# Patient Record
Sex: MALE | Race: WHITE | Employment: PART TIME | URBAN - METROPOLITAN AREA
[De-identification: names, ages, dates, MRNs, and addresses within clinical notes are randomized per-mention and may not be internally consistent; named-entity substitution may affect disease eponyms.]

---

## 2018-01-29 ENCOUNTER — HOSPITAL ENCOUNTER (OUTPATIENT)
Dept: LAB | Age: 68
Discharge: HOME | End: 2018-01-29

## 2018-01-29 PROCEDURE — 88305 TISSUE EXAM BY PATHOLOGIST: CPT | Performed by: INTERNAL MEDICINE

## 2019-02-12 PROBLEM — N52.01 ERECTILE DYSFUNCTION DUE TO ARTERIAL INSUFFICIENCY: Status: ACTIVE | Noted: 2019-02-12

## 2021-06-15 ENCOUNTER — HOSPITAL ENCOUNTER (EMERGENCY)
Age: 71
Discharge: HOME | End: 2021-06-16
Attending: EMERGENCY MEDICINE
Payer: MEDICARE

## 2021-06-15 DIAGNOSIS — S62.102A LEFT WRIST FRACTURE, CLOSED, INITIAL ENCOUNTER: Primary | ICD-10-CM

## 2021-06-15 PROCEDURE — 75810000053 HC SPLINT APPLICATION

## 2021-06-15 PROCEDURE — 99284 EMERGENCY DEPT VISIT MOD MDM: CPT

## 2021-06-16 VITALS
SYSTOLIC BLOOD PRESSURE: 126 MMHG | WEIGHT: 173 LBS | RESPIRATION RATE: 18 BRPM | BODY MASS INDEX: 24.82 KG/M2 | TEMPERATURE: 98.6 F | HEART RATE: 82 BPM | DIASTOLIC BLOOD PRESSURE: 68 MMHG | OXYGEN SATURATION: 100 %

## 2021-06-16 PROBLEM — S52.572A OTHER INTRAARTICULAR FRACTURE OF LOWER END OF LEFT RADIUS, INITIAL ENCOUNTER FOR CLOSED FRACTURE: Status: ACTIVE | Noted: 2021-06-16

## 2021-06-16 PROCEDURE — 74011250637 HC RX REV CODE- 250/637: Performed by: PHYSICIAN ASSISTANT

## 2021-06-16 RX ORDER — HYDROCODONE BITARTRATE AND ACETAMINOPHEN 5; 325 MG/1; MG/1
1 TABLET ORAL ONCE
Status: COMPLETED | OUTPATIENT
Start: 2021-06-16 | End: 2021-06-16

## 2021-06-16 RX ORDER — HYDROCODONE BITARTRATE AND ACETAMINOPHEN 5; 325 MG/1; MG/1
1 TABLET ORAL
Qty: 12 TABLET | Refills: 0 | Status: SHIPPED | OUTPATIENT
Start: 2021-06-16 | End: 2021-06-19

## 2021-06-16 RX ADMIN — HYDROCODONE BITARTRATE AND ACETAMINOPHEN 1 TABLET: 5; 325 TABLET ORAL at 00:37

## 2021-06-16 NOTE — ED NOTES
I have reviewed discharge instructions with the patient. The patient verbalized understanding. Patient left ED via Discharge Method: ambulatory to Home with self. Opportunity for questions and clarification provided. Patient given 1 scripts. Medication explained to pt and pt v\u about medication, pt in no acute distress at discharge. To continue your aftercare when you leave the hospital, you may receive an automated call from our care team to check in on how you are doing. This is a free service and part of our promise to provide the best care and service to meet your aftercare needs.  If you have questions, or wish to unsubscribe from this service please call [phone]. Thank you for Choosing our [facility] Emergency Department.

## 2021-06-16 NOTE — H&P (VIEW-ONLY)
Orthopaedic Hand Clinic Note Name: Bennie Fortune YOB: 1950 Gender: male MRN: [id number] CC: Patient referred for evaluation of hand pain HPI: Bennie Fortune is a 78 y.o. male right handed with a chief complaint of  left wrist pain. The injury occurred 1 day ago when the patient  the patient was learning how to play pickle ball, within the first 10 minutes he sustained a fall into the left wrist, he was seen at the emergency department with a put him in a splint and referred to me for assessment. The pain is located diffusely about the wrist. Denies numbness or paresthesias of the fingers. Evaluation has included x-rays. Treatment to date has included splint. Denies loss of consciousness, head injury or neck pain. ROS/Meds/PSH/PMH/FH/SH: I personally reviewed the patients standard intake form. Pertinents are discussed in the HPI Physical Examination: 
General: Awake and alert. HEENT: Normocephalic, atraumatic CV/Pulm: Breathing even and unlabored Skin: No obvious rashes noted. Lymphatic: No obvious evidence of lymphedema or lymphadenopathy Musculoskeletal Exam: 
Examination of the Left upper extremity demonstrates no open wounds. Negative Tinel's over left carpal tunnel. Sensation is intact throughout, cap refill in all fingers < 5 seconds. Finger motion is limited with moderate swelling of the hand and fingers. Tenderness over the distal radius. Positive tenderness over the ulnar aspect of the wrist. No tenderness at elbow, shoulder, clavicle or cervical spine. Imaging / Electrodiagnostic Tests: XR of the left wrist demonstrates a distal radius fracture with 50 degrees volar displacement and significant shortening consistent with a volar shear fracture Assessment:  
1. Other intraarticular fracture of lower end of left radius, initial encounter for closed fracture Plan:  
We discussed the diagnosis and different treatment options.  We discussed observation, casting, further imaging, and surgical fixation and the risks, benefits and alternatives of all these options. After discussing in detail the patient elects to proceed with left distal radius open reduction internal fixation. Patient voiced accordance and understanding of the information provided and the formulated plan. All questions were answered to the patient's satisfaction during the encounter. 5 This is an illness/condition that threatens bodily function Treatment at this time: Elective major surgery with procedural risk factors Patient understands risks and benefits of LEFT DISTAL RADIUS OPEN REDUCTION INTERNAL FIXATION including but not limited to nerve injury, vessel injury, infection, failure to achieve desired results and possible need for additional surgery. Patient understands and wishes to proceed with surgery. On Exam:  
The patient is alert and oriented; ;  
Lung auscultation is clear bilaterally Heart has RRR without murmurs Heath Coburn MD 
Orthopaedic Surgery 06/16/21 
1:14 PM

## 2021-06-16 NOTE — ED PROVIDER NOTES
Patient is a 77-year-old male who presents from urgent care for the complaint of left wrist pain and injury. Earlier this evening patient was playing pickle ball went to run for the ball when he tripped and landed on his outstretched left arm. Did not hit his head and does not take any aspirin or blood thinners. Patient's wife took him to [facility] urgent care where he had x-rays and was instructed to go to the ER for further evaluation. Patient states that his pain is fine as long as he does not try to move the wrist.  He denies any numbness or tingling in the left hand or fingers. No pain in the elbow. He denies any previous injury to the left wrist.  Patient is right-hand dominant. The history is provided by the patient. Arm Pain   Pertinent negatives include no numbness and no back pain.         Past Medical History:   Diagnosis Date    Abdominal pain, other specified site     Acute sinusitis, unspecified     Calculus of ureter     Elevated prostate specific antigen (PSA)     Hypercholesterolemia     Hyperparathyroidism, unspecified ([address]     Kidney stones        Past Surgical History:   Procedure Laterality Date    HX LITHOTRIPSY      HX ORTHOPAEDIC      knee surgery    HX ORTHOPAEDIC      ankle    HX OTHER SURGICAL      ESWL         Family History:   Problem Relation Age of Onset    Hypertension Mother     Cancer Father         lung       Social History     Socioeconomic History    Marital status:      Spouse name: Not on file    Number of children: Not on file    Years of education: Not on file    Highest education level: Not on file   Occupational History    Not on file   Tobacco Use    Smoking status: Never Smoker    Smokeless tobacco: Never Used   Substance and Sexual Activity    Alcohol use: No    Drug use: Not on file    Sexual activity: Not on file   Other Topics Concern    Not on file   Social History Narrative    Not on file     Social Determinants of Health Financial Resource Strain:     Difficulty of Paying Living Expenses:    Food Insecurity:     Worried About Running Out of Food in the Last Year:     [address] in the Last Year:    Transportation Needs:     Lack of Transportation (Medical):  Lack of Transportation (Non-Medical):    Physical Activity:     Days of Exercise per Week:     Minutes of Exercise per Session:    Stress:     Feeling of Stress :    Social Connections:     Frequency of Communication with Friends and Family:     Frequency of Social Gatherings with Friends and Family:     Attends Yazidism Services:     Active Member of Clubs or Organizations:     Attends Club or Organization Meetings:     Marital Status:    Intimate Partner Violence:     Fear of Current or Ex-Partner:     Emotionally Abused:     Physically Abused:     Sexually Abused: ALLERGIES: Patient has no known allergies. Review of Systems   Constitutional: Negative for chills and fever. HENT: Negative for sore throat. Respiratory: Negative for cough and shortness of breath. Cardiovascular: Negative for chest pain. Gastrointestinal: Negative for abdominal pain, nausea and vomiting. Musculoskeletal: Positive for arthralgias and joint swelling. Negative for back pain. Left wrist pain and swelling   Neurological: Negative for dizziness, weakness and numbness. Psychiatric/Behavioral: Negative for agitation and confusion. Vitals:    06/15/21 2047   BP: 127/66   Pulse: 84   Resp: 18   Temp: 98.6 °F (37 °C)   SpO2: 100%   Weight: 78.5 kg (173 lb)            Physical Exam  Vitals and nursing note reviewed. Constitutional:       General: He is not in acute distress. Appearance: He is not ill-appearing or toxic-appearing. HENT:      Head: Normocephalic and atraumatic. Musculoskeletal:      Right wrist: Swelling, deformity, tenderness and bony tenderness present. Decreased range of motion. Normal pulse.       Right hand: No swelling, deformity or tenderness. Normal range of motion. Normal sensation. Normal capillary refill. Normal pulse. Skin:     General: Skin is warm and dry. Neurological:      Mental Status: He is alert and oriented to person, place, and time. Psychiatric:         Mood and Affect: Mood normal.         Behavior: Behavior normal.         Thought Content: Thought content normal.         Judgment: Judgment normal.          MDM  Number of Diagnoses or Management Options  Left wrist fracture, closed, initial encounter  Diagnosis management comments: Patient presenting from urgent care with fracture of the left wrist.  Left arm neurovascularly intact. Was able to upload x-rays which shows comminuted distal radial fracture and ulnar styloid fracture. Spoke with dillon Ny, over the phone and was able to send her pictures of x-rays, patient to follow-up with Dr. Khadar Parker in the office tomorrow morning for further evaluation and treatment of injury. Patient was placed in sugar tong splint and shoulder sling described in procedure note. Will DC with [place] for pain. Instructed to elevate above the level of the heart, ice no more than 10 minutes at a time on and off throughout the day and follow-up closely with orthopedics. To return to the ER with any worsening pain, numbness to the hand, color change in the hand or concerning symptoms. Patient and family verbalize understanding and are agreeable to plan.            Aileen Eugene    Date/Time: 6/16/2021 12:04 AM  Performed by: LONDON Prescott  Authorized by: LONDON Prescott     Consent:     Consent obtained:  Verbal    Consent given by:  Patient    Risks discussed:  Discoloration, numbness, pain and swelling    Alternatives discussed:  No treatment  Pre-procedure details:     Sensation:  Normal  Procedure details:     Laterality:  Left    Location:  Wrist    Wrist:  L wrist    Cast type:  Long arm    Splint type:  Sugar tong    Supplies: Ortho-Glass  Post-procedure details:     Pain:  Unchanged    Sensation:  Normal    Patient tolerance of procedure:   Tolerated well, no immediate complications

## 2021-06-16 NOTE — DISCHARGE INSTRUCTIONS
Elevate the left arm, apply ice no more than 10 times at a time on and off throughout the day. Call ortho office in the morning to set up appointment with Dr. Lorena Kennedy. Return to the ER with any worsening pain, numbness in the hand/fingers, color changes in the hand or concerning symptoms.

## 2021-06-17 ENCOUNTER — HOSPITAL ENCOUNTER (OUTPATIENT)
Age: 71
Setting detail: OUTPATIENT SURGERY
Discharge: HOME | End: 2021-06-17
Attending: ORTHOPAEDIC SURGERY | Admitting: ORTHOPAEDIC SURGERY
Payer: MEDICARE

## 2021-06-17 ENCOUNTER — ANESTHESIA (OUTPATIENT)
Dept: SURGERY | Age: 71
End: 2021-06-17
Payer: MEDICARE

## 2021-06-17 ENCOUNTER — APPOINTMENT (OUTPATIENT)
Dept: GENERAL RADIOLOGY | Age: 71
End: 2021-06-17
Attending: ORTHOPAEDIC SURGERY
Payer: MEDICARE

## 2021-06-17 ENCOUNTER — ANESTHESIA EVENT (OUTPATIENT)
Dept: SURGERY | Age: 71
End: 2021-06-17
Payer: MEDICARE

## 2021-06-17 VITALS
DIASTOLIC BLOOD PRESSURE: 76 MMHG | SYSTOLIC BLOOD PRESSURE: 160 MMHG | HEIGHT: 70 IN | OXYGEN SATURATION: 97 % | HEART RATE: 61 BPM | WEIGHT: 170 LBS | BODY MASS INDEX: 24.34 KG/M2 | TEMPERATURE: 98.2 F | RESPIRATION RATE: 27 BRPM

## 2021-06-17 PROCEDURE — 77030002916 HC SUT ETHLN J&J -A: Performed by: ORTHOPAEDIC SURGERY

## 2021-06-17 PROCEDURE — 25608 OPTX DST RD XART FX/EPI SEP2: CPT | Performed by: ORTHOPAEDIC SURGERY

## 2021-06-17 PROCEDURE — C1713 ANCHOR/SCREW BN/BN,TIS/BN: HCPCS | Performed by: ORTHOPAEDIC SURGERY

## 2021-06-17 PROCEDURE — 76060000032 HC ANESTHESIA 0.5 TO 1 HR: Performed by: ORTHOPAEDIC SURGERY

## 2021-06-17 PROCEDURE — 77030000031 HC BIT DRL QC SYNT -C: Performed by: ORTHOPAEDIC SURGERY

## 2021-06-17 PROCEDURE — 76210000020 HC REC RM PH II FIRST 0.5 HR: Performed by: ORTHOPAEDIC SURGERY

## 2021-06-17 PROCEDURE — 74011250636 HC RX REV CODE- 250/636: Performed by: ORTHOPAEDIC SURGERY

## 2021-06-17 PROCEDURE — 76210000063 HC OR PH I REC FIRST 0.5 HR: Performed by: ORTHOPAEDIC SURGERY

## 2021-06-17 PROCEDURE — 74011250636 HC RX REV CODE- 250/636: Performed by: ANESTHESIOLOGY

## 2021-06-17 PROCEDURE — 77030031139 HC SUT VCRL2 J&J -A: Performed by: ORTHOPAEDIC SURGERY

## 2021-06-17 PROCEDURE — 2709999900 HC NON-CHARGEABLE SUPPLY: Performed by: ORTHOPAEDIC SURGERY

## 2021-06-17 PROCEDURE — 74011250636 HC RX REV CODE- 250/636: Performed by: NURSE ANESTHETIST, CERTIFIED REGISTERED

## 2021-06-17 PROCEDURE — 77030040922 HC BLNKT HYPOTHRM STRY -A: Performed by: ANESTHESIOLOGY

## 2021-06-17 PROCEDURE — 76010010054 HC POST OP PAIN BLOCK: Performed by: ORTHOPAEDIC SURGERY

## 2021-06-17 PROCEDURE — 76942 ECHO GUIDE FOR BIOPSY: CPT | Performed by: ORTHOPAEDIC SURGERY

## 2021-06-17 PROCEDURE — 77030000032 HC CUF TRNQT ZIMM -B: Performed by: ORTHOPAEDIC SURGERY

## 2021-06-17 PROCEDURE — 76010000160 HC OR TIME 0.5 TO 1 HR INTENSV-TIER 1: Performed by: ORTHOPAEDIC SURGERY

## 2021-06-17 PROCEDURE — 74011000250 HC RX REV CODE- 250: Performed by: NURSE ANESTHETIST, CERTIFIED REGISTERED

## 2021-06-17 PROCEDURE — 77030003602 HC NDL NRV BLK BBMI -B: Performed by: ANESTHESIOLOGY

## 2021-06-17 DEVICE — 2.4MM VA-LCP 2-CLMN VLR DSTL RADIUS PL 6H HD/3H SHAFT/LEFT
Type: IMPLANTABLE DEVICE | Site: ARM | Status: FUNCTIONAL
Brand: VA-LCP

## 2021-06-17 DEVICE — SCREW BNE L13MM DIA24MM UNICORTICAL S STL ST NONCANNULATED: Type: IMPLANTABLE DEVICE | Site: ARM | Status: FUNCTIONAL

## 2021-06-17 DEVICE — 2.4MM VA LOCKING SCREW STARDRIVE 16MM: Type: IMPLANTABLE DEVICE | Site: ARM | Status: FUNCTIONAL

## 2021-06-17 DEVICE — 2.4MM VA LOCKING SCREW STARDRIVE 18MM: Type: IMPLANTABLE DEVICE | Site: ARM | Status: FUNCTIONAL

## 2021-06-17 DEVICE — 2.4MM VA LOCKING SCREW STARDRIVE 20MM: Type: IMPLANTABLE DEVICE | Site: ARM | Status: FUNCTIONAL

## 2021-06-17 DEVICE — SCREW BNE L14MM DIA2.4MM CORT S STL ST T8 STARDRV RECESS: Type: IMPLANTABLE DEVICE | Site: ARM | Status: FUNCTIONAL

## 2021-06-17 DEVICE — SCREW BNE L12MM DIA24MM CORT S STL ST T8 STARDRV RECESS: Type: IMPLANTABLE DEVICE | Site: ARM | Status: FUNCTIONAL

## 2021-06-17 RX ORDER — PROPOFOL 10 MG/ML
INJECTION, EMULSION INTRAVENOUS
Status: DISCONTINUED | OUTPATIENT
Start: 2021-06-17 | End: 2021-06-17 | Stop reason: HOSPADM

## 2021-06-17 RX ORDER — MIDAZOLAM HYDROCHLORIDE 1 MG/ML
2 INJECTION, SOLUTION INTRAMUSCULAR; INTRAVENOUS
Status: DISCONTINUED | OUTPATIENT
Start: 2021-06-17 | End: 2021-06-17 | Stop reason: HOSPADM

## 2021-06-17 RX ORDER — MIDAZOLAM HYDROCHLORIDE 1 MG/ML
4 INJECTION, SOLUTION INTRAMUSCULAR; INTRAVENOUS ONCE
Status: COMPLETED | OUTPATIENT
Start: 2021-06-17 | End: 2021-06-17

## 2021-06-17 RX ORDER — PROPOFOL 10 MG/ML
INJECTION, EMULSION INTRAVENOUS AS NEEDED
Status: DISCONTINUED | OUTPATIENT
Start: 2021-06-17 | End: 2021-06-17 | Stop reason: HOSPADM

## 2021-06-17 RX ORDER — SODIUM CHLORIDE, SODIUM LACTATE, POTASSIUM CHLORIDE, CALCIUM CHLORIDE 600; 310; 30; 20 MG/100ML; MG/100ML; MG/100ML; MG/100ML
75 INJECTION, SOLUTION INTRAVENOUS CONTINUOUS
Status: DISCONTINUED | OUTPATIENT
Start: 2021-06-17 | End: 2021-06-17 | Stop reason: HOSPADM

## 2021-06-17 RX ORDER — OXYCODONE HYDROCHLORIDE 5 MG/1
5 TABLET ORAL
Status: DISCONTINUED | OUTPATIENT
Start: 2021-06-17 | End: 2021-06-17 | Stop reason: HOSPADM

## 2021-06-17 RX ORDER — HYDROMORPHONE HYDROCHLORIDE 1 MG/ML
0.5 INJECTION, SOLUTION INTRAMUSCULAR; INTRAVENOUS; SUBCUTANEOUS
Status: DISCONTINUED | OUTPATIENT
Start: 2021-06-17 | End: 2021-06-17 | Stop reason: HOSPADM

## 2021-06-17 RX ORDER — CEFAZOLIN SODIUM/WATER 2 G/20 ML
2 SYRINGE (ML) INTRAVENOUS ONCE
Status: COMPLETED | OUTPATIENT
Start: 2021-06-17 | End: 2021-06-17

## 2021-06-17 RX ORDER — SODIUM CHLORIDE 0.9 % (FLUSH) 0.9 %
5-40 SYRINGE (ML) INJECTION AS NEEDED
Status: DISCONTINUED | OUTPATIENT
Start: 2021-06-17 | End: 2021-06-17 | Stop reason: HOSPADM

## 2021-06-17 RX ORDER — DEXAMETHASONE SODIUM PHOSPHATE 4 MG/ML
INJECTION, SOLUTION INTRA-ARTICULAR; INTRALESIONAL; INTRAMUSCULAR; INTRAVENOUS; SOFT TISSUE
Status: COMPLETED | OUTPATIENT
Start: 2021-06-17 | End: 2021-06-17

## 2021-06-17 RX ORDER — SODIUM CHLORIDE 0.9 % (FLUSH) 0.9 %
5-40 SYRINGE (ML) INJECTION EVERY 8 HOURS
Status: DISCONTINUED | OUTPATIENT
Start: 2021-06-17 | End: 2021-06-17 | Stop reason: HOSPADM

## 2021-06-17 RX ORDER — FENTANYL CITRATE 50 UG/ML
100 INJECTION, SOLUTION INTRAMUSCULAR; INTRAVENOUS ONCE
Status: COMPLETED | OUTPATIENT
Start: 2021-06-17 | End: 2021-06-17

## 2021-06-17 RX ORDER — LIDOCAINE HYDROCHLORIDE 20 MG/ML
INJECTION, SOLUTION EPIDURAL; INFILTRATION; INTRACAUDAL; PERINEURAL AS NEEDED
Status: DISCONTINUED | OUTPATIENT
Start: 2021-06-17 | End: 2021-06-17 | Stop reason: HOSPADM

## 2021-06-17 RX ORDER — HYDROCODONE BITARTRATE AND ACETAMINOPHEN 5; 325 MG/1; MG/1
2 TABLET ORAL AS NEEDED
Status: DISCONTINUED | OUTPATIENT
Start: 2021-06-17 | End: 2021-06-17 | Stop reason: HOSPADM

## 2021-06-17 RX ORDER — LIDOCAINE HYDROCHLORIDE 10 MG/ML
0.1 INJECTION INFILTRATION; PERINEURAL AS NEEDED
Status: DISCONTINUED | OUTPATIENT
Start: 2021-06-17 | End: 2021-06-17 | Stop reason: HOSPADM

## 2021-06-17 RX ADMIN — FENTANYL CITRATE 50 MCG: 50 INJECTION, SOLUTION INTRAMUSCULAR; INTRAVENOUS at 09:44

## 2021-06-17 RX ADMIN — LIDOCAINE HYDROCHLORIDE 20 MG: 20 INJECTION, SOLUTION EPIDURAL; INFILTRATION; INTRACAUDAL; PERINEURAL at 10:12

## 2021-06-17 RX ADMIN — PROPOFOL 100 MCG/KG/MIN: 10 INJECTION, EMULSION INTRAVENOUS at 10:15

## 2021-06-17 RX ADMIN — CEFAZOLIN 2 G: 1 INJECTION, POWDER, FOR SOLUTION INTRAVENOUS at 10:04

## 2021-06-17 RX ADMIN — PROPOFOL 30 MG: 10 INJECTION, EMULSION INTRAVENOUS at 10:12

## 2021-06-17 RX ADMIN — DEXAMETHASONE SODIUM PHOSPHATE 4 MG: 4 INJECTION, SOLUTION INTRAMUSCULAR; INTRAVENOUS at 09:47

## 2021-06-17 RX ADMIN — ROPIVACAINE HYDROCHLORIDE 30 ML: 5 INJECTION, SOLUTION EPIDURAL; INFILTRATION; PERINEURAL at 09:47

## 2021-06-17 RX ADMIN — SODIUM CHLORIDE, SODIUM LACTATE, POTASSIUM CHLORIDE, AND CALCIUM CHLORIDE 75 ML/HR: 600; 310; 30; 20 INJECTION, SOLUTION INTRAVENOUS at 09:35

## 2021-06-17 RX ADMIN — MIDAZOLAM 3 MG: 1 INJECTION INTRAMUSCULAR; INTRAVENOUS at 09:44

## 2021-06-17 NOTE — DISCHARGE INSTRUCTIONS
Postoperative  Instructions:      Weightbearing or Lifting: You  are  not  allowed  to  lift  any  weight  or  bear  any  weight  on  the  surgical  extremity  until  cleared  by  your  surgeon. Dressing  instructions:    Keep  your  dressing  and/or  splint  clean  and  dry  at  all  times. It  will  be  removed  at  your  first  post-operative  appointment or therapy apppointment. Your  stitches  will  be  removed  at  this  visit. Showering  Instructions:  May  shower  But keep surgical dressing clean and dry until removed as explained above. Pain  Control:  - You  have  been  given  a  prescription  to  be  taken  as  directed  for  post-operative  pain  control. In  addition,  elevate  the  operative  extremity  above  the  heart  at  all  times  to  prevent  swelling  and  throbbing  pain. - If you develop constipation while taking narcotic pain medications (Norco, Hydrocodone, Percocet, Oxycodone, Dilaudid, Hydromorphone) take  over-the-counter  Colace,  100mg  by  mouth  twice  a  Day. - Nausea  is  a  common  side  effect  of  many  pain  medications. You  will  want  to  eat something  before  taking  your  pain  medicine  to  help  prevent  Nausea. - If  you  are  taking  a  prescription  pain  medication  that  contains  acetaminophen,  we  recommend  that  you  do  not  take  additional  over  the  counter  acetaminophen  (Tylenol®). Other  pain  relieving  options:   - Using  a  cold  pack  to  ice  the  affected  area  a  few  times  a  day  (15  to  20  minutes  at  a  time)  can  help  to  relieve  pain,  reduce  swelling  and  bruising.      - Elevation  of  the  affected  area  can  also  help  to  reduce  pain  and  swelling. Did  you  receive  a  nerve  Block? A  nerve  block  can  provide  pain  relief  for  one  hour  to  two  days  after  your  surgery.   As  long  as  the  nerve  block  is  working,  you  will  experience  little  or  no  sensation in  the  area  the  surgeon  operated  on. As  the  nerve  block  wears  off,  you  will  begin  to  experience  pain  or  discomfort. It  is  very  important  that  you  begin  taking  your  prescribed  pain  medication  before  the  nerve  block  fully  wears  off. The first sign that the nerve block is wearing off is tingling in your fingers. Treating  your  pain  at  the  first  sign  of  the  block  wearing  off  will  ensure  your  pain  is  better  controlled  and  more  tolerable  when  full-sensation  returns. Do  not  wait  until  the  pain  is  intolerable,  as  the  medicine  will  be  less  effective. It  is  better  to  treat  pain  in  advance  than  to  try  and  catch  up. General  Anesthesia or Sedation:      If  you  did  not  receive  a  nerve  block  during  your  surgery,  you  will  need  to  start  taking  your  pain  medication  shortly  after  your  surgery  and  should  continue  to  do  so  as  prescribed  by  your  surgeon. Please  call  [phone]  with any concern and ask to speak with Gill Whitten. Concerning problems include:      -  Excessive  redness  of  the  incisions      -  Drainage  for  more  than  2  Days after surgery or any foul smelling drainage  -  Fever  of  more  than  101.5  F      Please  call  [phone]  if  you  do  not  receive  or  are  unsure  of  your  first  follow-up  appointment. You  should  see  the  doctor  10-14  days  after  your  Surgery. Thank you for choosing me and [address] [place] [address] for your care. I will go above and beyond to ensure you receive the best care possible. Teo Silva MD, PhD      Damaris Carmichael Call your doctor if pain is NOT relieved by medication.      Excessive bleeding that does not stop after holding pressure over the area  · Temperature of 101 degrees F or above  · Excessive redness, swelling or bruising, and/ or green or yellow, smelly discharge from incision      TYPICAL SIDE EFFECTS OF PAIN MEDICATION:     Constipation: Drink lots of fluids. Over the counter stool softener if needed.    Nausea: Take pain medication with food. Call your doctor with persistent nausea. ACTIVITY  · As tolerated and as directed by your doctor. · Bathe or shower as directed by your doctor. DIET  · Day of surgery: Clear liquids until no nausea or vomiting; small portion, light diet [place] foods (ex: baked chicken, plain rice, grits, scrambled eggs, toast). Nothing greasy, fried or spicy today. · Advance to regular diet on second day, unless your doctor orders otherwise. · If nausea and vomiting continues, call your doctor. PAIN  · Take pain medication as directed by your doctor. · DO NOT take aspirin or blood thinners unless directed by your doctor. AFTER ANESTHESIA   · For the first 24 hours: DO NOT Drive, Drink alcoholic beverages, or Make important decisions. · Be aware of dizziness following anesthesia and while taking pain medication. CONTRACEPTIVES  During your procedure you potentially received medication(s) which may reduce the effectiveness of contraceptives. Please consider other forms of contraception for 1 month following your procedure if you are currently using contraceptives as your primary form of birth control. In addition to this, it is recommended you continue your contraceptive as prescribed, unless otherwise instructed by your physician. PATIENT INSTRUCTIONS:    After general anesthesia or intravenous sedation, for 24 hours or while taking prescription Narcotics:  · Limit your activities  · Do not drive and operate hazardous machinery  · Do not make important personal or business decisions  · Do  not drink alcoholic beverages  · If you have not urinated within 8 hours after discharge, please contact your surgeon on call. *  Please give a list of your current medications to your Primary Care Provider.     * Please update this list whenever your medications are discontinued, doses are      changed, or new medications (including over-the-counter products) are added. *  Please carry medication information at all times in case of emergency situations. Preventing Infection at Home  We care about preventing infection and avoiding the spread of germs - not only when you are in the hospital but also when you return home. When you return home from the hospital, its important to take the following steps to help prevent infection and avoid spreading germs that could infect you and others. Ask everyone in your home to follow these guidelines, too. Clean Your Hands  · Clean your hands whenever your hands are visibly dirty, before you eat, before or after touching your mouth, nose or eyes, and before preparing food. Clean them after contact with body fluids, using the restroom, touching animals or changing diapers. · When washing hands, wet them with warm water and work up a lather. Rub hands for at least 15 seconds, then rinse them and pat them dry with a clean towel or paper towel. · When using hand sanitizers, it should take about 15 seconds to rub your hands dry. If not, you probably didnt apply enough . Cover Your Sneeze or Cough  Germs are released into the air whenever you sneeze or cough. To prevent the spread of infection:  · Turn away from other people before coughing or sneezing. · Cover your mouth or nose with a tissue when you cough or sneeze. Put the tissue in the trash. · If you dont have a tissue, cough or sneeze into your upper sleeve, not your hands. · Always clean your hands after coughing or sneezing. Care for Wounds  Your skin is your bodys first line of defense against germs, but an open wound leaves an easy way for germs to enter your body.  To prevent infection:  · Clean your hands before and after changing wound dressings, and wear gloves to change dressings if recommended by your doctor. · Take special care with IV lines or other devices inserted into the body. If you must touch them, clean your hands first.  · Follow any specific instructions from your doctor to care for your wounds. Contact your doctor if you experience any signs of infection, such as fever or increased redness at the surgical or wound site. Keep a Clean Home  · Clean or wipe commonly touched hard surfaces like door handles, sinks, tabletops, phones and TV remotes. · Use products labeled disinfectant to kill harmful bacteria and viruses. · Use a clean cloth or paper towel to clean and dry surfaces. Wiping surfaces with a dirty dishcloth, sponge or towel will only spread germs. · Never share toothbrushes, stokes, drinking glasses, utensils, razor blades, face cloths or bath towels to avoid spreading germs. · Be sure that the linens that you sleep on are clean. · Keep pets away from wounds and wash your hands after touching pets, their toys or bedding. We care about you and your health. Remember, preventing infections is a team effort between you, your family, friends and health care providers. These are general instructions for a healthy lifestyle:    No smoking/ No tobacco products/ Avoid exposure to second hand smoke  Surgeon General's Warning:  Quitting smoking now greatly reduces serious risk to your health. Obesity, smoking, and sedentary lifestyle greatly increases your risk for illness  A healthy diet, regular physical exercise & weight monitoring are important for maintaining a healthy lifestyle  You may be retaining fluid if you have a history of heart failure or if you experience any of the following symptoms:  Weight gain of 3 pounds or more overnight or 5 pounds in a week, increased swelling in our hands or feet or shortness of breath while lying flat in bed. Please call your doctor as soon as you notice any of these symptoms; do not wait until your next office visit.     Recognize signs and symptoms of STROKE:  F-face looks uneven  A-arms unable to move or move unevenly  S-speech slurred or non-existent  T-time-call 911 as soon as signs and symptoms begin-DO NOT go       Back to bed or wait to see if you get better-TIME IS BRAIN. Learning About Coronavirus [phone])  Coronavirus [phone]): Overview  What is coronavirus (COVID-19)? The coronavirus disease (COVID-19) is caused by a virus. It is an illness that was first found in [place], [place], in December 2019. It has since spread worldwide. The virus can cause fever, cough, and trouble breathing. In severe cases, it can cause pneumonia and make it hard to breathe without help. It can cause death. Coronaviruses are a large group of viruses. They cause the common cold. They also cause more serious illnesses like Middle East respiratory syndrome (MERS) and severe acute respiratory syndrome (SARS). COVID-19 is caused by a novel coronavirus. That means it's a new type that has not been seen in people before. This virus spreads person-to-person through droplets from coughing and sneezing. It can also spread when you are close to someone who is infected. And it can spread when you touch something that has the virus on it, such as a doorknob or a tabletop. What can you do to protect yourself from coronavirus (COVID-19)? The best way to protect yourself from getting sick is to:  · Avoid areas where there is an outbreak. · Avoid contact with people who may be infected. · Wash your hands often with soap or alcohol-based hand sanitizers. · Avoid crowds and try to stay at least 6 feet away from other people. · Wash your hands often, especially after you cough or sneeze. Use soap and water, and scrub for at least 20 seconds. If soap and water aren't available, use an alcohol-based hand . · Avoid touching your mouth, nose, and eyes. What can you do to avoid spreading the virus to others?   To help avoid spreading the virus to others:  · Cover your mouth with a tissue when you cough or sneeze. Then throw the tissue in the trash. · Use a disinfectant to clean things that you touch often. · Wear a cloth face cover if you have to go to public areas. · Stay home if you are sick or have been exposed to the virus. Don't go to school, work, or public areas. And don't use public transportation, ride-shares, or taxis unless you have no choice. · If you are sick:  ? Leave your home only if you need to get medical care. But call the doctor's office first so they know you're coming. And wear a face cover. ? Wear the face cover whenever you're around other people. It can help stop the spread of the virus when you cough or sneeze. ? Clean and disinfect your home every day. Use household  and disinfectant wipes or sprays. Take special care to clean things that you grab with your hands. These include doorknobs, remote controls, phones, and handles on your refrigerator and microwave. And don't forget countertops, tabletops, bathrooms, and computer keyboards. When to call for help  [id number] anytime you think you may need emergency care. For example, call if:  · You have severe trouble breathing. (You can't talk at all.)  · You have constant chest pain or pressure. · You are severely dizzy or lightheaded. · You are confused or can't think clearly. · Your face and lips have a blue color. · You pass out (lose consciousness) or are very hard to wake up. Call your doctor now if you develop symptoms such as:  · Shortness of breath. · Fever. · Cough. If you need to get care, call ahead to the doctor's office for instructions before you go. Make sure you wear a face cover to prevent exposing other people to the virus. Where can you get the latest information? The following health organizations are tracking and studying this virus. Their websites contain the most up-to-date information.  Soham Hernandez also learn what to do if you think you may have been exposed to the virus. · U.S. Centers for Disease Control and Prevention (CDC): The CDC provides updated news about the disease and travel advice. The website also tells you how to prevent the spread of infection. www.cdc.gov  · World Health Organization [facility]): WHO offers information about the virus outbreaks. WHO also has travel advice. www.who.int  Current as of: May 8, 2020               Content Version: 12.5  © 2006-2020 Health[employer]. Care instructions adapted under license by [employer] (which disclaims liability or warranty for this information). If you have questions about a medical condition or this instruction, always ask your healthcare professional. [address] any warranty or liability for your use of this information.

## 2021-06-17 NOTE — OP NOTES
OPERATIVE NOTE    Rei Frederick   [id number]  6/17/2021    PRE-OP DIAGNOSIS: Closed die punch fracture of distal radius, left, initial encounter [S52.293G]       POST-OP DIAGNOSIS: Closed die punch fracture of distal radius, left, initial encounter [S52.391K]    LATERALITY: Left    PROCEDURES PERFORMED:  Open treatment of Left intra-articular distal radius fracture with internal fixation of two fragments CPT 00629      SURGEON:  Marva Gill MD    IMPLANTS:  Implant Name Type Inv.  Item Serial No.  Lot No. LRB No. Used Action   PLATE RAD DSTL 2-C 6H HD/3H LT -- VOLAR VA-LCP 2.4MM - [id number]  PLATE RAD DSTL 2-C 6H HD/3H LT -- VOLAR VA-LCP 2.4MM  [employer] [place] [id number] Left 1 Implanted   SCREW BNE L13MM [id number] UNICORTICAL S STL ST NONCANNULATED - [id number]  SCREW BNE L13MM [id number] UNICORTICAL S STL ST NONCANNULATED  [facility]_ [id number] Left 1 Implanted   SCR VA LCK ST STRDRV 2.4X20MM --  - [id number]  SCR VA LCK ST STRDRV 2.4X20MM --   [employer] [place] [id number] Left 1 Implanted   SCR VA LCK ST STRDRV 2.4X16MM --  - [id number]  SCR VA LCK ST STRDRV 2.4X16MM --   [employer] [place] [id number] Left 1 Implanted   SCR VA LCK ST STRDRV 2.4X18MM --  - [id number]  SCR VA LCK ST STRDRV 2.4X18MM --   [employer] [place] [id number] Left 1 Implanted   SCR BNE CRTX ST T8 2.4X12MM SS --  - [id number]  SCR BNE CRTX ST T8 2.4X12MM SS --   [employer] [place] [id number] Left 1 Implanted   SCR BNE CRTX ST T8 2.4X14MM SS --  - [id number]  SCR BNE CRTX ST T8 2.4X14MM SS --   [employer] [place] [id number] Left 1 Implanted       Procedure(s):  LEFT DISTAL RADIUS OPEN REDUCTION INTERNAL FIXATION/ PLEXUS    Surgeon(s):  Katia Clark MD    Procedure(s):  LEFT DISTAL RADIUS OPEN REDUCTION INTERNAL FIXATION/ PLEXUS    ANESTHESIA: Regional    ESTIMATED BLOOD LOSS: Minimal    COMPLICATIONS: None    TOURNIQUET TIME:   Total Tourniquet Time Documented:  Upper Arm (Left) - 30 minutes  Total: Upper Arm (Left) - 30 minutes      INDICATION FOR PROCEDURE:  Leroy Barron sustained the above mentioned injuries as a result of a fall. Surgical and non-surgical treatment options were discussed with the patient and their family, as well as the risk and benefits of each option. Together, we decided to proceed with surgical management of their fracture. Specific to this treatment plan, we discussed in detail surgical risks including scar, pain, bleeding, infection, anesthetic risks, neurovascular injury, nonunion, malunion, hardware loosening or failure, need for further surgery,  weakness, stiffness, risk of death and potential risk of other unforseen complication. The patient did consent to the procedure after discussion of the risks and benefits. DESCRIPTION OF PROCEDURE:  The patient was identified in the holding room. The Left wrist was marked and confirmed as the correct operative site. They were then brought to the OR and general anesthesia was induced. They were transferred onto the OR table in the supine position. All bony prominences were well padded. SCDs were placed on the bilateral legs throughout the case. A timeout was performed, verifying the correct patient, the correct side and the correct procedure. Antibiotics were then administered, and were redosed during the procedure as needed at indicated intervals. A non-sterile tourniquet was placed on the arm    The upper extremity and shoulder girdle was pre scrubbed and then prepped and draped in routine sterile fashion. An incisional timeout was performed re-confirming the correct patient, surgical site and procedure, as well as verifying antibiotics. A volar FCR approach was utilized. Dissection was taken down through skin and subcutaneous tissue. Hemostasis was achieved with bipolar cautery. The FCR sheath was incised and it was retracted ulnarly. The FCR subsheath was incised.   The pronator quadratus was then sharply released from the radius in an L-shaped fashion and was retracted ulnarly to expose the distal radius. Dissection was taken down to bone. The fracture was identified and inspected. Fracture was reduced with manipulation and placement of a [place] elevator into the fracture to unhinge the volar cortex. Reduction was confirmed with fluoroscopy. A distal radius plate was chosen and placed along the volar surface. Once plate position was confirmed, the plate was secured to the bone with a nonlocking screw into the oblong hole. The distal row of screw holes were filled with locking screws of measured length after unicortical drilling. The plate was secured at the shaft with two additional screws, one more nonlocking and one locking screw. AP and lateral x-rays were obtained, showing adequate fracture reduction with restoration of height, palmar tilt and radial inclination. Articular wrist view was used after placement of each distal screw to demonstrated that all screws were outside of the joint. Final x-rays were obtained. These showed adequate reduction of the fracture, as well as adequate plate and screw placement and length. The tourniquet was deflated and hemostasis was ensured. The wounds were then thoroughly irrigated and closed in layered fashion with 4-0 vicryl and 4-0 stratafix. A sterile dressing was applied followed by a Volar splint    The patient was awakened and taken to PACU in stable condition. All sponge and needle counts were correct at the end of the case. I was present and scrubbed for the entire procedure.        DISPOSITION : Home    MECHANICAL VTE (DVT) PROPHYLAXIS: sequential compression devices    CHEMICAL VTE (DVT) PROPHYLAXIS: None warranted for this case    WEIGHT BEARING STATUS:   NWB on the Left upper extremity    FOLLOW-UP: in 10-14 days for suture removal.       Bartholomew Baumgarten, MD, PhD  Orthopaedic Surgery  06/17/21  10:52 AM

## 2021-06-17 NOTE — ANESTHESIA PREPROCEDURE EVALUATION
Relevant Problems   No relevant active problems       Anesthetic History   No history of anesthetic complications            Review of Systems / Medical History  Patient summary reviewed and pertinent labs reviewed    Pulmonary  Within defined limits                 Neuro/Psych   Within defined limits           Cardiovascular  Within defined limits                Exercise tolerance: >4 METS     GI/Hepatic/Renal     GERD: well controlled    Renal disease: stones       Endo/Other  Within defined limits           Other Findings              Physical Exam    Airway  Mallampati: II  TM Distance: 4 - 6 cm  Neck ROM: normal range of motion   Mouth opening: Normal     Cardiovascular  Regular rate and rhythm,  S1 and S2 normal,  no murmur, click, rub, or gallop             Dental         Pulmonary  Breath sounds clear to auscultation               Abdominal         Other Findings            Anesthetic Plan    ASA: 2  Anesthesia type: total IV anesthesia      Post-op pain plan if not by surgeon: peripheral nerve block single    Induction: Intravenous  Anesthetic plan and risks discussed with: Patient

## 2021-06-17 NOTE — ANESTHESIA PROCEDURE NOTES
Peripheral Block    Start time: 6/17/2021 9:45 AM  End time: 6/17/2021 9:48 AM  Performed by: Alveria Closs., MD  Authorized by: Alveria Closs., MD       Pre-procedure: Indications: at surgeon's request, post-op pain management and procedure for pain    Preanesthetic Checklist: patient identified, risks and benefits discussed, site marked, timeout performed, anesthesia consent given and patient being monitored    Timeout Time: 09:44 EDT          Block Type:   Block Type:  Supraclavicular  Laterality:  Left  Monitoring:  Standard ASA monitoring, responsive to questions, oxygen, continuous pulse ox, heart rate and frequent vital sign checks  Injection Technique:  Single shot  Procedures: ultrasound guided and nerve stimulator    Patient Position: supine  Prep: chlorhexidine    Location:  Supraclavicular  Needle Type:  Stimuplex  Needle Gauge:  22 G  Needle Localization:  Nerve stimulator and ultrasound guidance  Motor Response comment:   Motor Response: minimal motor response >0.4 mA   Medication Injected:  Ropivacaine 0.5% with epinephrine 1:200,000 injection, 30 mL (Mixture components: EPINEPHrine HCl (PF) 1 mg/mL (1 mL) Soln, . 005 mL; ropivacaine (PF) 5 mg/mL (0.5 %) Soln, 1 mL)  dexamethasone (DECADRON) 4 mg/mL injection, 4 mg  Med Admin Time: 6/17/2021 9:47 AM    Assessment:  Number of attempts:  1  Injection Assessment:  Incremental injection every 5 mL, no paresthesia, ultrasound image on chart, local visualized surrounding nerve on ultrasound, negative aspiration for blood and no intravascular symptoms  Patient tolerance:  Patient tolerated the procedure well with no immediate complications

## 2021-06-17 NOTE — INTERVAL H&P NOTE
H&P Update: 
Albarupesh Silveira was seen and examined. History and physical has been reviewed. The patient has been examined. There have been no significant clinical changes since the completion of the originally dated History and Physical. 
 
Rene Joseph MD 
Orthopaedic Surgery 06/17/21 
9:25 AM

## 2021-11-17 ENCOUNTER — HOSPITAL ENCOUNTER (OUTPATIENT)
Dept: LAB | Age: 71
Discharge: HOME | End: 2021-11-17

## 2021-11-17 PROCEDURE — 88305 TISSUE EXAM BY PATHOLOGIST: CPT

## 2022-02-15 ENCOUNTER — APPOINTMENT (RX ONLY)
Dept: URBAN - METROPOLITAN AREA CLINIC 330 | Facility: CLINIC | Age: 72
Setting detail: DERMATOLOGY
End: 2022-02-15

## 2022-02-15 DIAGNOSIS — L30.8 OTHER SPECIFIED DERMATITIS: ICD-10-CM

## 2022-02-15 PROCEDURE — ? ADDITIONAL NOTES

## 2022-02-15 PROCEDURE — ? COUNSELING

## 2022-02-15 PROCEDURE — ? PRESCRIPTION MEDICATION MANAGEMENT

## 2022-02-15 PROCEDURE — 99213 OFFICE O/P EST LOW 20 MIN: CPT

## 2022-02-15 PROCEDURE — ? TREATMENT REGIMEN

## 2022-02-15 ASSESSMENT — LOCATION ZONE DERM: LOCATION ZONE: TRUNK

## 2022-02-15 ASSESSMENT — LOCATION DETAILED DESCRIPTION DERM: LOCATION DETAILED: PERIUMBILICAL SKIN

## 2022-02-15 ASSESSMENT — LOCATION SIMPLE DESCRIPTION DERM: LOCATION SIMPLE: ABDOMEN

## 2022-02-15 NOTE — PROCEDURE: TREATMENT REGIMEN
Detail Level: Zone
Initiate Treatment: Use moisturizer after shower while wet/damp, let soak in then apply 2nd layer\\nDML forte

## 2022-02-15 NOTE — PROCEDURE: MIPS QUALITY
Quality 226: Preventive Care And Screening: Tobacco Use: Screening And Cessation Intervention: Patient screened for tobacco use and is an ex/non-smoker
Quality 111:Pneumonia Vaccination Status For Older Adults: Pneumococcal Vaccination not Administered or Previously Received, Reason not Otherwise Specified
Quality 431: Preventive Care And Screening: Unhealthy Alcohol Use - Screening: Patient not identified as an unhealthy alcohol user when screened for unhealthy alcohol use using a systematic screening method
Detail Level: Detailed
Quality 110: Preventive Care And Screening: Influenza Immunization: Influenza Immunization Administered during Influenza season

## 2022-02-17 ENCOUNTER — RX ONLY (OUTPATIENT)
Age: 72
Setting detail: RX ONLY
End: 2022-02-17

## 2022-02-17 RX ORDER — TRIAMCINOLONE ACETONIDE 1 MG/G
CREAM TOPICAL
Qty: 454 | Refills: 2 | Status: CANCELLED

## 2022-02-17 RX ORDER — TRIAMCINOLONE ACETONIDE 1 MG/G
CREAM TOPICAL
Qty: 454 | Refills: 2 | Status: ERX | COMMUNITY
Start: 2022-02-17

## 2022-03-19 PROBLEM — N52.01 ERECTILE DYSFUNCTION DUE TO ARTERIAL INSUFFICIENCY: Status: ACTIVE | Noted: 2019-02-12

## 2022-03-19 PROBLEM — S52.572A OTHER INTRAARTICULAR FRACTURE OF LOWER END OF LEFT RADIUS, INITIAL ENCOUNTER FOR CLOSED FRACTURE: Status: ACTIVE | Noted: 2021-06-16

## 2022-06-30 ENCOUNTER — APPOINTMENT (RX ONLY)
Dept: URBAN - METROPOLITAN AREA CLINIC 329 | Facility: CLINIC | Age: 72
Setting detail: DERMATOLOGY
End: 2022-06-30

## 2022-06-30 DIAGNOSIS — L57.0 ACTINIC KERATOSIS: ICD-10-CM

## 2022-06-30 DIAGNOSIS — L81.4 OTHER MELANIN HYPERPIGMENTATION: ICD-10-CM

## 2022-06-30 DIAGNOSIS — D22 MELANOCYTIC NEVI: ICD-10-CM

## 2022-06-30 DIAGNOSIS — L72.8 OTHER FOLLICULAR CYSTS OF THE SKIN AND SUBCUTANEOUS TISSUE: ICD-10-CM

## 2022-06-30 DIAGNOSIS — L11.1 TRANSIENT ACANTHOLYTIC DERMATOSIS [GROVER]: ICD-10-CM

## 2022-06-30 PROBLEM — D22.5 MELANOCYTIC NEVI OF TRUNK: Status: ACTIVE | Noted: 2022-06-30

## 2022-06-30 PROCEDURE — ? LIQUID NITROGEN

## 2022-06-30 PROCEDURE — 17003 DESTRUCT PREMALG LES 2-14: CPT

## 2022-06-30 PROCEDURE — ? SHAVE REMOVAL

## 2022-06-30 PROCEDURE — ? ADDITIONAL NOTES

## 2022-06-30 PROCEDURE — 11311 SHAVE SKIN LESION 0.6-1.0 CM: CPT

## 2022-06-30 PROCEDURE — ? FULL BODY SKIN EXAM - DECLINED

## 2022-06-30 PROCEDURE — 99213 OFFICE O/P EST LOW 20 MIN: CPT | Mod: 25

## 2022-06-30 PROCEDURE — ? PRESCRIPTION MEDICATION MANAGEMENT

## 2022-06-30 PROCEDURE — ? SUNSCREEN RECOMMENDATIONS

## 2022-06-30 PROCEDURE — 17000 DESTRUCT PREMALG LESION: CPT | Mod: 59

## 2022-06-30 PROCEDURE — ? COUNSELING

## 2022-06-30 ASSESSMENT — LOCATION SIMPLE DESCRIPTION DERM
LOCATION SIMPLE: ABDOMEN
LOCATION SIMPLE: FRONTAL SCALP
LOCATION SIMPLE: UPPER BACK
LOCATION SIMPLE: RIGHT FOREHEAD
LOCATION SIMPLE: LEFT FOREHEAD
LOCATION SIMPLE: CHEST
LOCATION SIMPLE: RIGHT SCALP
LOCATION SIMPLE: RIGHT TEMPLE
LOCATION SIMPLE: LOWER BACK
LOCATION SIMPLE: RIGHT UPPER ARM
LOCATION SIMPLE: LEFT CHEEK

## 2022-06-30 ASSESSMENT — LOCATION DETAILED DESCRIPTION DERM
LOCATION DETAILED: STERNUM
LOCATION DETAILED: RIGHT FOREHEAD
LOCATION DETAILED: RIGHT CENTRAL FRONTAL SCALP
LOCATION DETAILED: RIGHT ANTERIOR DISTAL UPPER ARM
LOCATION DETAILED: INFERIOR THORACIC SPINE
LOCATION DETAILED: RIGHT RIB CAGE
LOCATION DETAILED: RIGHT CENTRAL TEMPLE
LOCATION DETAILED: LEFT SUPERIOR MEDIAL MALAR CHEEK
LOCATION DETAILED: EPIGASTRIC SKIN
LOCATION DETAILED: MEDIAL FRONTAL SCALP
LOCATION DETAILED: LEFT RIB CAGE
LOCATION DETAILED: RIGHT SUPERIOR FOREHEAD
LOCATION DETAILED: LEFT SUPERIOR FOREHEAD
LOCATION DETAILED: SUPERIOR LUMBAR SPINE

## 2022-06-30 ASSESSMENT — LOCATION ZONE DERM
LOCATION ZONE: SCALP
LOCATION ZONE: TRUNK
LOCATION ZONE: FACE
LOCATION ZONE: ARM

## 2022-06-30 NOTE — PROCEDURE: PRESCRIPTION MEDICATION MANAGEMENT
Continue Regimen: Triamcinolone cream (patient has)
Detail Level: Zone
Render In Strict Bullet Format?: No

## 2022-06-30 NOTE — PROCEDURE: MIPS QUALITY
Detail Level: Detailed
Quality 111:Pneumonia Vaccination Status For Older Adults: Pneumococcal vaccine was not administered on or after patient’s 60th birthday and before the end of the measurement period, reason not otherwise specified
Quality 130: Documentation Of Current Medications In The Medical Record: Current Medications Documented
Quality 47: Advance Care Plan: Advance Care Planning discussed and documented in the medical record; patient did not wish or was not able to name a surrogate decision maker or provide an advance care plan.

## 2022-07-11 DIAGNOSIS — R97.20 ELEVATED PSA: Primary | ICD-10-CM

## 2022-07-13 ENCOUNTER — NURSE ONLY (OUTPATIENT)
Dept: UROLOGY | Age: 72
End: 2022-07-13

## 2022-07-13 ENCOUNTER — TELEPHONE (OUTPATIENT)
Dept: UROLOGY | Age: 72
End: 2022-07-13

## 2022-07-13 DIAGNOSIS — R97.20 ELEVATED PSA: ICD-10-CM

## 2022-07-13 LAB — PSA SERPL-MCNC: 6.3 NG/ML

## 2022-07-13 NOTE — TELEPHONE ENCOUNTER
Hello pt came to inform that he needs a refill on his medication (sidlenafil 20mg). He no longer uses the [facility]. Pt wants to get it sent to [address] ([address] [phone]). He did state that the [facility] only carries the 25mg. Please contact pt if we can send in the prescription thank you!

## 2022-07-14 RX ORDER — SILDENAFIL CITRATE 20 MG/1
20 TABLET ORAL 3 TIMES DAILY
Qty: 30 TABLET | Refills: 1 | Status: SHIPPED | OUTPATIENT
Start: 2022-07-14

## 2022-08-03 ENCOUNTER — OFFICE VISIT (OUTPATIENT)
Dept: UROLOGY | Age: 72
End: 2022-08-03
Payer: MEDICARE

## 2022-08-03 DIAGNOSIS — N52.01 ERECTILE DYSFUNCTION DUE TO ARTERIAL INSUFFICIENCY: ICD-10-CM

## 2022-08-03 DIAGNOSIS — R97.20 ELEVATED PSA: Primary | ICD-10-CM

## 2022-08-03 LAB
BILIRUBIN, URINE, POC: NEGATIVE
BLOOD URINE, POC: NEGATIVE
GLUCOSE URINE, POC: NEGATIVE
KETONES, URINE, POC: NEGATIVE
LEUKOCYTE ESTERASE, URINE, POC: NEGATIVE
NITRITE, URINE, POC: NEGATIVE
PH, URINE, POC: 6 (ref 4.6–8)
PROTEIN,URINE, POC: NEGATIVE
SPECIFIC GRAVITY, URINE, POC: 1.03 (ref 1–1.03)
URINALYSIS CLARITY, POC: NORMAL
URINALYSIS COLOR, POC: NORMAL
UROBILINOGEN, POC: NORMAL

## 2022-08-03 PROCEDURE — 3017F COLORECTAL CA SCREEN DOC REV: CPT | Performed by: UROLOGY

## 2022-08-03 PROCEDURE — G8421 BMI NOT CALCULATED: HCPCS | Performed by: UROLOGY

## 2022-08-03 PROCEDURE — 81003 URINALYSIS AUTO W/O SCOPE: CPT | Performed by: UROLOGY

## 2022-08-03 PROCEDURE — 1036F TOBACCO NON-USER: CPT | Performed by: UROLOGY

## 2022-08-03 PROCEDURE — 99213 OFFICE O/P EST LOW 20 MIN: CPT | Performed by: UROLOGY

## 2022-08-03 PROCEDURE — 1123F ACP DISCUSS/DSCN MKR DOCD: CPT | Performed by: UROLOGY

## 2022-08-03 PROCEDURE — G8427 DOCREV CUR MEDS BY ELIG CLIN: HCPCS | Performed by: UROLOGY

## 2022-08-03 RX ORDER — HYDROCHLOROTHIAZIDE 50 MG/1
50 TABLET ORAL DAILY
COMMUNITY
Start: 2022-01-24 | End: 2023-01-19

## 2022-08-03 ASSESSMENT — ENCOUNTER SYMPTOMS
NAUSEA: 0
BACK PAIN: 0

## 2022-08-03 NOTE — PROGRESS NOTES
[facility]  [address] [facility]    [address] [facility] [place]  : 1950    Chief Complaint   Patient presents with    Follow-up     HPI     Krish Sprague is a 73 y.o.  male with a PMH of elevated PSA s/p negative TRUS biopsy 2012 and ED on sildenafil who returns for follow up. Previously followed with Dr. Víctor Werner. Last seen 2021  PSA baseline ~5. PSA 6.3 this year from 5.3 last year. He is voiding well without bothersome urgency, frequency, urinary retention, hematuria, urinary incontinence. He is not on any prostate meds. Sildenafil is working well for his ED. Lab Results   Component Value Date    PSA 6.3 (H) 2022    PSA 5.3 (H) 2021    PSA 5.1 (H) 2020         Past Medical History:   Diagnosis Date    Abdominal pain, other specified site     Acute sinusitis, unspecified     Calculus of ureter     Elevated prostate specific antigen (PSA)     GERD (gastroesophageal reflux disease)     Hypercholesterolemia     Hyperparathyroidism, unspecified ([address]     Kidney stones      Past Surgical History:   Procedure Laterality Date    COLONOSCOPY      LITHOTRIPSY      ORTHOPEDIC SURGERY Right     knee surgery    ORTHOPEDIC SURGERY Right     ankle    OTHER SURGICAL HISTORY      ESWL     Current Outpatient Medications   Medication Sig Dispense Refill    hydroCHLOROthiazide (HYDRODIURIL) 50 MG tablet Take 50 mg by mouth in the morning.       sildenafil (REVATIO) 20 MG tablet Take 1 tablet by mouth 3 times daily May take up to 5 at a time as needed, dispense #30 as needed refill 30 tablet 1    atorvastatin (LIPITOR) 20 MG tablet Take by mouth      ibuprofen (ADVIL;MOTRIN) 200 MG tablet Take by mouth      omeprazole (PRILOSEC) 20 MG delayed release capsule Take 20 mg by mouth daily      ondansetron (ZOFRAN-ODT) 4 MG disintegrating tablet Take 4 mg by mouth every 8 hours as needed       No current facility-administered medications for this visit. Not on File  Social History     Socioeconomic History    Marital status:      Spouse name: Not on file    Number of children: Not on file    Years of education: Not on file    Highest education level: Not on file   Occupational History    Not on file   Tobacco Use    Smoking status: Never    Smokeless tobacco: Never   Substance and Sexual Activity    Alcohol use: Yes    Drug use: Never    Sexual activity: Not on file   Other Topics Concern    Not on file   Social History Narrative    Not on file     Social Determinants of Health     Financial Resource Strain: Not on file   Food Insecurity: Not on file   Transportation Needs: Not on file   Physical Activity: Not on file   Stress: Not on file   Social Connections: Not on file   Intimate Partner Violence: Not on file   Housing Stability: Not on file     Family History   Problem Relation Age of Onset    Cancer Father         lung    Hypertension Mother        Review of Systems  Constitutional:   Negative for fever. GI:  Negative for nausea. Musculoskeletal:  Negative for back pain. Urinalysis  UA - Dipstick  Results for orders placed or performed in visit on 08/03/22   AMB POC URINALYSIS DIP STICK AUTO W/O MICRO   Result Value Ref Range    Color (UA POC)      Clarity (UA POC)      Glucose, Urine, POC Negative Negative    Bilirubin, Urine, POC Negative Negative    KETONES, Urine, POC Negative Negative    Specific Gravity, Urine, POC 1.030 1.001 - 1.035    Blood (UA POC) Negative Negative    pH, Urine, POC 6.0 4.6 - 8.0    Protein, Urine, POC Negative Negative    Urobilinogen, POC 0.2 mg/dL     Nitrite, Urine, POC Negative Negative    Leukocyte Esterase, Urine, POC Negative Negative       There were no vitals taken for this visit.      GENERAL: No acute distress, Awake, Alert, Oriented X 3, Gait normal  CARDIAC: regular rate and rhythm  CHEST AND LUNG: Easy work of breathing, clear to auscultation bilaterally, no cyanosis  ABDOMEN: soft, non tender, non-distended, positive bowel sounds, no organomegaly, no palpable masses, no guarding, no rebound tenderness  ALLI: 35 gram, symmetric, smooth without nodules. SKIN: No rash, no erythema, no lacerations or abrasions, no ecchymosis  NEUROLOGIC: cranial nerves 2-12 grossly intact     Assessment and Plan    ICD-10-CM    1. Elevated PSA  R97.20 AMB POC URINALYSIS DIP STICK AUTO W/O MICRO     PSA, Diagnostic      2. Erectile dysfunction due to arterial insufficiency  N52.01         Elevated PSA  PSA up to 6.3 this year from 5.3. Usually in 5 range  Doing well. Will continue annual screening. Follow up 1 year with PSA     ED:   Continue sildenafil. Doing well. Does not need refills today. I have spent 20 minutes today reviewing previous notes, test results and face to face with the patient as well as documenting. Yaya Ramirez M.D.     [facility]  [place] [address] [address] [facility]  Phone: [phone]  Fax: [phone]

## 2022-10-06 ENCOUNTER — APPOINTMENT (RX ONLY)
Dept: URBAN - METROPOLITAN AREA CLINIC 329 | Facility: CLINIC | Age: 72
Setting detail: DERMATOLOGY
End: 2022-10-06

## 2022-10-06 DIAGNOSIS — L57.8 OTHER SKIN CHANGES DUE TO CHRONIC EXPOSURE TO NONIONIZING RADIATION: ICD-10-CM

## 2022-10-06 DIAGNOSIS — L82.1 OTHER SEBORRHEIC KERATOSIS: ICD-10-CM

## 2022-10-06 DIAGNOSIS — L21.8 OTHER SEBORRHEIC DERMATITIS: ICD-10-CM

## 2022-10-06 DIAGNOSIS — L85.3 XEROSIS CUTIS: ICD-10-CM

## 2022-10-06 PROCEDURE — ? COUNSELING

## 2022-10-06 PROCEDURE — ? SUNSCREEN RECOMMENDATIONS

## 2022-10-06 PROCEDURE — 99214 OFFICE O/P EST MOD 30 MIN: CPT

## 2022-10-06 PROCEDURE — ? FULL BODY SKIN EXAM - DECLINED

## 2022-10-06 PROCEDURE — ? PRESCRIPTION

## 2022-10-06 RX ORDER — MOMETASONE FUROATE 1 MG/G
OINTMENT TOPICAL
Qty: 45 | Refills: 3 | Status: ERX | COMMUNITY
Start: 2022-10-06

## 2022-10-06 RX ADMIN — MOMETASONE FUROATE: 1 OINTMENT TOPICAL at 00:00

## 2022-10-06 ASSESSMENT — LOCATION SIMPLE DESCRIPTION DERM
LOCATION SIMPLE: ABDOMEN
LOCATION SIMPLE: FRONTAL SCALP
LOCATION SIMPLE: LEFT POSTERIOR THIGH
LOCATION SIMPLE: ABDOMEN
LOCATION SIMPLE: RIGHT EYEBROW
LOCATION SIMPLE: UPPER BACK
LOCATION SIMPLE: LEFT UPPER ARM
LOCATION SIMPLE: LEFT UPPER BACK
LOCATION SIMPLE: LEFT THIGH
LOCATION SIMPLE: LEFT POSTERIOR THIGH
LOCATION SIMPLE: RIGHT UPPER BACK
LOCATION SIMPLE: LEFT UPPER BACK
LOCATION SIMPLE: LEFT CHEEK
LOCATION SIMPLE: LEFT UPPER ARM
LOCATION SIMPLE: RIGHT CHEEK
LOCATION SIMPLE: CHEST
LOCATION SIMPLE: LEFT CHEEK
LOCATION SIMPLE: LEFT THIGH
LOCATION SIMPLE: RIGHT FOREHEAD
LOCATION SIMPLE: LEFT FOREHEAD

## 2022-10-06 ASSESSMENT — LOCATION DETAILED DESCRIPTION DERM
LOCATION DETAILED: PERIUMBILICAL SKIN
LOCATION DETAILED: LEFT ANTERIOR PROXIMAL THIGH
LOCATION DETAILED: LEFT DISTAL POSTERIOR THIGH
LOCATION DETAILED: LEFT MEDIAL MALAR CHEEK
LOCATION DETAILED: RIGHT INFERIOR MEDIAL FOREHEAD
LOCATION DETAILED: RIGHT INFERIOR MEDIAL UPPER BACK
LOCATION DETAILED: RIGHT FOREHEAD
LOCATION DETAILED: LEFT DISTAL POSTERIOR UPPER ARM
LOCATION DETAILED: RIGHT MID-UPPER BACK
LOCATION DETAILED: LEFT RIB CAGE
LOCATION DETAILED: LEFT MEDIAL SUPERIOR CHEST
LOCATION DETAILED: LEFT ANTERIOR DISTAL THIGH
LOCATION DETAILED: LEFT SUPERIOR FOREHEAD
LOCATION DETAILED: LEFT DISTAL POSTERIOR THIGH
LOCATION DETAILED: EPIGASTRIC SKIN
LOCATION DETAILED: LEFT LATERAL UPPER BACK
LOCATION DETAILED: RIGHT INFERIOR CENTRAL MALAR CHEEK
LOCATION DETAILED: INFERIOR THORACIC SPINE
LOCATION DETAILED: LEFT MEDIAL MALAR CHEEK
LOCATION DETAILED: RIGHT CENTRAL EYEBROW
LOCATION DETAILED: RIGHT MEDIAL FOREHEAD
LOCATION DETAILED: LEFT DISTAL POSTERIOR UPPER ARM
LOCATION DETAILED: MEDIAL FRONTAL SCALP
LOCATION DETAILED: LEFT INFERIOR MEDIAL UPPER BACK
LOCATION DETAILED: PERIUMBILICAL SKIN
LOCATION DETAILED: LEFT ANTERIOR DISTAL UPPER ARM
LOCATION DETAILED: LEFT MID-UPPER BACK

## 2022-10-06 ASSESSMENT — LOCATION ZONE DERM
LOCATION ZONE: SCALP
LOCATION ZONE: ARM
LOCATION ZONE: TRUNK
LOCATION ZONE: FACE
LOCATION ZONE: LEG
LOCATION ZONE: ARM
LOCATION ZONE: FACE
LOCATION ZONE: LEG
LOCATION ZONE: TRUNK

## 2022-11-30 ENCOUNTER — OFFICE VISIT (OUTPATIENT)
Dept: ORTHOPEDIC SURGERY | Age: 72
End: 2022-11-30

## 2022-11-30 VITALS — WEIGHT: 170 LBS | HEIGHT: 70 IN | BODY MASS INDEX: 24.34 KG/M2

## 2022-11-30 DIAGNOSIS — M75.101 ROTATOR CUFF TEAR ARTHROPATHY OF RIGHT SHOULDER: ICD-10-CM

## 2022-11-30 DIAGNOSIS — M12.811 ROTATOR CUFF TEAR ARTHROPATHY OF RIGHT SHOULDER: ICD-10-CM

## 2022-11-30 DIAGNOSIS — M25.511 RIGHT SHOULDER PAIN, UNSPECIFIED CHRONICITY: Primary | ICD-10-CM

## 2022-11-30 DIAGNOSIS — M19.011 DEGENERATIVE JOINT DISEASE OF RIGHT ACROMIOCLAVICULAR JOINT: ICD-10-CM

## 2022-11-30 RX ORDER — METHYLPREDNISOLONE ACETATE 80 MG/ML
80 INJECTION, SUSPENSION INTRA-ARTICULAR; INTRALESIONAL; INTRAMUSCULAR; SOFT TISSUE ONCE
Status: COMPLETED | OUTPATIENT
Start: 2022-11-30 | End: 2022-11-30

## 2022-11-30 RX ORDER — DICLOFENAC SODIUM 75 MG/1
75 TABLET, DELAYED RELEASE ORAL 2 TIMES DAILY
Qty: 60 TABLET | Refills: 0 | Status: SHIPPED | OUTPATIENT
Start: 2022-11-30

## 2022-11-30 RX ADMIN — METHYLPREDNISOLONE ACETATE 80 MG: 80 INJECTION, SUSPENSION INTRA-ARTICULAR; INTRALESIONAL; INTRAMUSCULAR; SOFT TISSUE at 14:21

## 2022-11-30 NOTE — PROGRESS NOTES
Name: Jake Gloria  YOB: 1950  Gender: male  MRN: [id number]      What: Right shoulder pain  How: Insidious onset  When: 1 to 2 months duration        HPI: Jake Gloria is a 81 y.o. right-hand-dominant gentleman seen for right shoulder problems. He has history of hypertension and hypercholesterolemia. I saw him roughly a decade ago and injected his right shoulder. He states that I told him that he is small rotator cuff tear at that time. No records are currently available today. He notes the right shoulder bothers him. It with certain motions. He is sore and weak. It bothers him when he plays pickle ball. No numbness or tingling. No left shoulder problems. ROS/Meds/PSH/PMH/FH/SH: A ten system review of systems was performed and is negative other than what is in the HPI. Tobacco:  reports that he has never smoked. He has never used smokeless tobacco.  Ht 5' 10\" (1.778 m)   Wt 170 lb (77.1 kg)   BMI 24.39 kg/m²      Physical Examination:  He is an awake alert pleasant gentleman ambulating without difficulty  Slight restriction in cervical spine range of motion without radicular findings    The left shoulder has 0 to 180 degrees of active and 0 to 180 degrees passive forward elevation. Internal rotation is to T6. External rotation is to 60 degrees at the side. In the 90 degree abducted position 90 degrees of external and 90 degrees internal rotation  The AC joint is non-tender  SC joint is non-tender. Greater tuberosity is non-tender. negative biceps  Negative O'Briens sign  negative lift-off sign  Negative belly press sign  Negative bear huggers sign  negative drop sign  negative hornblower's sign  No posterior glenohumeral joint line tenderness. No evident excessive external rotation  Rotator cuff strength is 5/5.  negative external rotation stress test.   Negative empty can sign  There is no evident anterior or posterior apprehension with a negative sulcus sign. No instability  negative external and internal Rotation lag sign  Neurovascularly intact. The right shoulder has 0 to 180 degrees of active and 0 to 180 degrees passive forward elevation. Pain at end range of motion  Internal rotation is to T10. External rotation is to 60 degrees at the side. In the 90 degree abducted position 90 degrees of external and 90 degrees internal rotation  The AC joint is tender  SC joint is non-tender. Greater tuberosity is tender. negative biceps  Negative O'Briens sign  negative lift-off sign  Negative belly press sign  Negative bear huggers sign  negative drop sign  negative hornblower's sign  No posterior glenohumeral joint line tenderness. No evident excessive external rotation  Rotator cuff strength is 5-/5 with weakness  Positive external rotation stress test.   Positive empty can sign  There is no evident anterior or posterior apprehension with a negative sulcus sign. No instability  negative external and internal Rotation lag sign  Neurovascularly intact. Data Reviewed:      XR: AP AP the scapular outlet throwers and axillary views right shoulder   Clinical Indication    ICD-10-CM    1. Right shoulder pain, unspecified chronicity  M25.511 XR SHOULDER RIGHT (MIN 2 VIEWS)      2. Rotator cuff tear arthropathy of right shoulder  M75.101     M12.811       3. Degenerative joint disease of right acromioclavicular joint  M19.011          Report: AP AP scapular outlet throwers and axillary views right shoulder demonstrate a type II acromion. Degenerative changes in the [facility] joint. High riding humeral head loss of acromiohumeral interval and secondary degenerative changes consistent with rotator cuff tear arthropathy.     Impression: Rotator cuff tear arthropathy right shoulder  AC OA right shoulder   Hussein Gauthier MD              Minor Procedure:    OFFICE PROCEDURE PROGRESS NOTE    Chart reviewed for the following:   Abiel Negron MD, have reviewed the History, Physical and updated the Allergic reactions for [address] [facility] [address] performed immediately prior to start of procedure:   Víctor Egan MD, have performed the following reviews on Helen Pizanoal prior to the start of the procedure:            * Patient was identified by name and date of birth   * Agreement on procedure being performed was verified  * Risks and Benefits explained to the patient  * Procedure site verified and marked as necessary  * Patient was positioned for comfort     Time: 1:42 PM  Date of procedure: 11/30/2022  Procedure performed by:  Kameron Lacy MD    After sterile prep and drape of the right shoulder, the patient received a 9:1 injection into the subacromial space. It consisted of 4.5 mL of 2% Xylocaine, 4.5 mL of 0.5% bupivacaine and 80 mg of Depo-Medrol. A sterile dressing was applied. The patient tolerated the procedure well. Impression:   1. Right shoulder pain, unspecified chronicity    2. Rotator cuff tear arthropathy of right shoulder    3. Degenerative joint disease of right acromioclavicular joint       Hypertension  Hypercholesterolemia  Status post ORIF of a left wrist fracture by Dr. Wily Cullen:   I discussed the problem with the patient. I discussed nonoperative versus operative intervention including injections. Specifically we discussed the need for potential reverse right total shoulder arthroplasty. We will defer that for now. I injected his right shoulder. I gave him prescriptions for Voltaren and Voltaren gel. We will get him started on a shoulder rehab program.  I will recheck him back in 6 weeks. We may need to obtain an MRI of the right shoulder if he still symptomatic at that point. I will recheck him back in 6 weeks  4 This is an undiagnosed new problem with uncertain prognosis    Follow up: Return in about 6 weeks (around 1/11/2023).            Kameron Lacy MD

## 2022-12-29 RX ORDER — SILDENAFIL CITRATE 20 MG/1
TABLET ORAL
Qty: 30 TABLET | Refills: 0 | Status: SHIPPED | OUTPATIENT
Start: 2022-12-29

## 2023-01-11 ENCOUNTER — OFFICE VISIT (OUTPATIENT)
Dept: ORTHOPEDIC SURGERY | Age: 73
End: 2023-01-11
Payer: MEDICARE

## 2023-01-11 DIAGNOSIS — M12.811 ROTATOR CUFF TEAR ARTHROPATHY OF RIGHT SHOULDER: ICD-10-CM

## 2023-01-11 DIAGNOSIS — M19.011 DEGENERATIVE JOINT DISEASE OF RIGHT ACROMIOCLAVICULAR JOINT: ICD-10-CM

## 2023-01-11 DIAGNOSIS — M75.101 ROTATOR CUFF TEAR ARTHROPATHY OF RIGHT SHOULDER: ICD-10-CM

## 2023-01-11 DIAGNOSIS — M25.511 RIGHT SHOULDER PAIN, UNSPECIFIED CHRONICITY: Primary | ICD-10-CM

## 2023-01-11 PROCEDURE — 1123F ACP DISCUSS/DSCN MKR DOCD: CPT | Performed by: ORTHOPAEDIC SURGERY

## 2023-01-11 PROCEDURE — 3017F COLORECTAL CA SCREEN DOC REV: CPT | Performed by: ORTHOPAEDIC SURGERY

## 2023-01-11 PROCEDURE — G8420 CALC BMI NORM PARAMETERS: HCPCS | Performed by: ORTHOPAEDIC SURGERY

## 2023-01-11 PROCEDURE — G8427 DOCREV CUR MEDS BY ELIG CLIN: HCPCS | Performed by: ORTHOPAEDIC SURGERY

## 2023-01-11 PROCEDURE — 1036F TOBACCO NON-USER: CPT | Performed by: ORTHOPAEDIC SURGERY

## 2023-01-11 PROCEDURE — G8484 FLU IMMUNIZE NO ADMIN: HCPCS | Performed by: ORTHOPAEDIC SURGERY

## 2023-01-11 PROCEDURE — 99212 OFFICE O/P EST SF 10 MIN: CPT | Performed by: ORTHOPAEDIC SURGERY

## 2023-01-11 NOTE — PROGRESS NOTES
Name: Rocío Kathleen  YOB: 1950  Gender: male  MRN: [id number]              HPI: Rocío Kathleen is a 81 y.o. right-hand-dominant gentleman seen for right shoulder problems. He returns and notes that after his right shoulder injection his pain is gone and he is able to do all activities. ROS/Meds/PSH/PMH/FH/SH: A ten system review of systems was performed and is negative other than what is in the HPI. Tobacco:  reports that he has never smoked. He has never used smokeless tobacco.  There were no vitals taken for this visit. Physical Examination:  He is an awake alert pleasant gentleman ambulating without difficulty  Slight restriction in cervical spine range of motion without radicular findings    The left shoulder has 0 to 180 degrees of active and 0 to 180 degrees passive forward elevation. Internal rotation is to T6. External rotation is to 60 degrees at the side. In the 90 degree abducted position 90 degrees of external and 90 degrees internal rotation  The AC joint is non-tender  SC joint is non-tender. Greater tuberosity is non-tender. negative biceps  Negative O'Briens sign  negative lift-off sign  Negative belly press sign  Negative bear huggers sign  negative drop sign  negative hornblower's sign  No posterior glenohumeral joint line tenderness. No evident excessive external rotation  Rotator cuff strength is 5/5.  negative external rotation stress test.   Negative empty can sign  There is no evident anterior or posterior apprehension with a negative sulcus sign. No instability  negative external and internal Rotation lag sign  Neurovascularly intact. The right shoulder has 0 to 180 degrees of active and 0 to 180 degrees passive forward elevation. Internal rotation is to T6. External rotation is to 60 degrees at the side.    In the 90 degree abducted position 90 degrees of external and 90 degrees internal rotation  The AC joint is non-tender  SC joint is non-tender.   Greater tuberosity is non-tender.  negative biceps  Negative O'Briens sign  negative lift-off sign  Negative belly press sign  Negative bear huggers sign  negative drop sign  negative hornblower's sign  No posterior glenohumeral joint line tenderness.   No evident excessive external rotation  Rotator cuff strength is 5/5.  negative external rotation stress test.   Negative empty can sign  There is no evident anterior or posterior apprehension with a negative sulcus sign.   No instability  negative external and internal Rotation lag sign  Neurovascularly intact.        Data Reviewed:               Minor Procedure:      Impression:   1. Right shoulder pain, unspecified chronicity    2. Rotator cuff tear arthropathy of right shoulder    3. Degenerative joint disease of right acromioclavicular joint       Hypertension  Hypercholesterolemia  Status post ORIF of a left wrist fracture by Dr. De León    Plan:   I discussed the plan with the patient.  He is doing extremely well.  He can do activities as tolerated.  I will recheck him back on an as-needed basis.    2.  Self-limited problem  Follow up: No follow-ups on file.           ANN MCCLURE JR, MD

## 2023-02-07 ENCOUNTER — TELEPHONE (OUTPATIENT)
Dept: ORTHOPEDIC SURGERY | Age: 73
End: 2023-02-07

## 2023-02-07 NOTE — TELEPHONE ENCOUNTER
Pt called and saw Dr. Ranjit Barahona previously. He is having left knee pain and wants to see him for this as well. Will Dr. Ranjit Barahona see him?

## 2023-02-21 ENCOUNTER — OFFICE VISIT (OUTPATIENT)
Dept: ORTHOPEDIC SURGERY | Age: 73
End: 2023-02-21

## 2023-02-21 DIAGNOSIS — M12.811 ROTATOR CUFF TEAR ARTHROPATHY OF RIGHT SHOULDER: ICD-10-CM

## 2023-02-21 DIAGNOSIS — M17.0 PRIMARY OSTEOARTHRITIS OF BOTH KNEES: ICD-10-CM

## 2023-02-21 DIAGNOSIS — M19.011 DEGENERATIVE JOINT DISEASE OF RIGHT ACROMIOCLAVICULAR JOINT: ICD-10-CM

## 2023-02-21 DIAGNOSIS — M25.511 RIGHT SHOULDER PAIN, UNSPECIFIED CHRONICITY: ICD-10-CM

## 2023-02-21 DIAGNOSIS — M25.562 LEFT KNEE PAIN, UNSPECIFIED CHRONICITY: Primary | ICD-10-CM

## 2023-02-21 DIAGNOSIS — M75.101 ROTATOR CUFF TEAR ARTHROPATHY OF RIGHT SHOULDER: ICD-10-CM

## 2023-02-21 RX ORDER — DICLOFENAC SODIUM 75 MG/1
75 TABLET, DELAYED RELEASE ORAL 2 TIMES DAILY
Qty: 60 TABLET | Refills: 0 | Status: SHIPPED | OUTPATIENT
Start: 2023-02-21

## 2023-02-21 NOTE — PROGRESS NOTES
Name: Shanice Campa  YOB: 1950  Gender: male  MRN: [id number]    What: Left knee swelling  How: Insidious onset  When: 3 weeks ago          HPI: Shanice Campa is a 81 y.o. right-hand-dominant gentleman seen for left knee problems. I saw him 6 weeks ago for his right shoulder. He denies any previous knee issues. He did a lot of walking and he noted the onset of left knee swelling particularly in the back of the knee. He then went to [facility] and the knee swelled up. The knee swells when he is active. No locking catching. No injury. He denies any previous knee issues. ROS/Meds/PSH/PMH/FH/SH: A ten system review of systems was performed and is negative other than what is in the HPI. Tobacco:  reports that he has never smoked. He has never used smokeless tobacco.  There were no vitals taken for this visit. Physical Examination:  He is an awake alert pleasant gentleman ambulating without difficulty  Slight restriction in cervical spine range of motion without radicular findings    The left shoulder has 0 to 180 degrees of active and 0 to 180 degrees passive forward elevation. Internal rotation is to T6. External rotation is to 60 degrees at the side. In the 90 degree abducted position 90 degrees of external and 90 degrees internal rotation  The AC joint is non-tender  SC joint is non-tender. Greater tuberosity is non-tender. negative biceps  Negative O'Briens sign  negative lift-off sign  Negative belly press sign  Negative bear huggers sign  negative drop sign  negative hornblower's sign  No posterior glenohumeral joint line tenderness. No evident excessive external rotation  Rotator cuff strength is 5/5.  negative external rotation stress test.   Negative empty can sign  There is no evident anterior or posterior apprehension with a negative sulcus sign. No instability  negative external and internal Rotation lag sign  Neurovascularly intact.     The right shoulder has 0 to 180 degrees of active and 0 to 180 degrees passive forward elevation. Internal rotation is to T6. External rotation is to 60 degrees at the side. In the 90 degree abducted position 90 degrees of external and 90 degrees internal rotation  The AC joint is non-tender  SC joint is non-tender. Greater tuberosity is non-tender. negative biceps  Negative O'Briens sign  negative lift-off sign  Negative belly press sign  Negative bear huggers sign  negative drop sign  negative hornblower's sign  No posterior glenohumeral joint line tenderness. No evident excessive external rotation  Rotator cuff strength is 5/5.  negative external rotation stress test.   Negative empty can sign  There is no evident anterior or posterior apprehension with a negative sulcus sign. No instability  negative external and internal Rotation lag sign  Neurovascularly intact. The right knee has a range of motion of 0 to 135 degrees  negative Lachman,  negative anterior drawer,   negative posterior drawer  negative pivot. Good tibial step-off,   No varus or valgus laxity at 0 or 30 degrees. Negative lateral joint line tenderness   negative lateral Tonny. Negative medial joint line tenderness  negative medial Tonny. No evidence of any posterolateral instability. No patellofemoral pain. Negative compression,   negative apprehension  no effusion. Calves Are non-tender,  neurovascularly patient is intact. Negative Homans. MPFL is non-tender. Patient Can fully extend the knee. Good quad tone  No erythema. Negative Dial test.        The left knee has a range of motion of 0 to 135 degrees  Global left knee pain  No swelling  negative Lachman,  negative anterior drawer,   negative posterior drawer  negative pivot. Good tibial step-off,   No varus or valgus laxity at 0 or 30 degrees. Negative lateral joint line tenderness   negative lateral Tonny.    Negative medial joint line tenderness  negative medial Tonny. No evidence of any posterolateral instability. No patellofemoral pain. Negative compression,   negative apprehension  no effusion. Calves Are non-tender,  neurovascularly patient is intact. Negative Homans. MPFL is non-tender. Patient Can fully extend the knee. Good quad tone  No erythema. Negative Dial test.            Data Reviewed:      XR: AP standing PA 45 degree weightbearing lateral and sunrise views both knees   Clinical Indication    ICD-10-CM    1. Left knee pain, unspecified chronicity  M25.562 XR KNEE BILATERAL STANDARD      2. Primary osteoarthritis of both knees  M17.0       3. Right shoulder pain, unspecified chronicity  M25.511       4. Rotator cuff tear arthropathy of right shoulder  M75.101     M12.811       5. Degenerative joint disease of right acromioclavicular joint  M19.011          Report: AP standing PA 45 degree weightbearing lateral and sunrise views both knees demonstrate early degenerative changes in all 3 compartments with a preserved joint space. Impression: Early osteoarthritis both Neema Eduardo MD               Minor Procedure:      Impression:   1. Left knee pain, unspecified chronicity    2. Primary osteoarthritis of both knees    3. Right shoulder pain, unspecified chronicity    4. Rotator cuff tear arthropathy of right shoulder    5. Degenerative joint disease of right acromioclavicular joint       Hypertension  Hypercholesterolemia  Status post ORIF of a left wrist fracture by Dr. Jenn Gaston:   I discussed the problem with the patient. I discussed nonoperative versus operative intervention including injections. He does not require arthroscopic right knee intervention at this point. We will defer injections. We will pre-CERT him for viscosupplementation. I gave him prescriptions for Voltaren and Voltaren gel. We will get him started on a knee exercise program.  I will reassess his progress back in 6 weeks.   If he is no better we would consider an MRI of the left knee. I will reassess his progress back in 6 weeks    4. Undiagnosed new problem with uncertain prognosis  Follow up: Return in about 6 weeks (around 4/4/2023).            Shay Chester MD

## 2023-03-20 ENCOUNTER — TELEPHONE (OUTPATIENT)
Dept: ORTHOPEDIC SURGERY | Age: 73
End: 2023-03-20

## 2023-03-20 DIAGNOSIS — M25.562 LEFT KNEE PAIN, UNSPECIFIED CHRONICITY: Primary | ICD-10-CM

## 2023-04-04 ENCOUNTER — OFFICE VISIT (OUTPATIENT)
Dept: ORTHOPEDIC SURGERY | Age: 73
End: 2023-04-04

## 2023-04-04 DIAGNOSIS — S83.242D TEAR OF MEDIAL MENISCUS OF LEFT KNEE, CURRENT, UNSPECIFIED TEAR TYPE, SUBSEQUENT ENCOUNTER: ICD-10-CM

## 2023-04-04 DIAGNOSIS — M25.511 RIGHT SHOULDER PAIN, UNSPECIFIED CHRONICITY: ICD-10-CM

## 2023-04-04 DIAGNOSIS — M19.011 DEGENERATIVE JOINT DISEASE OF RIGHT ACROMIOCLAVICULAR JOINT: ICD-10-CM

## 2023-04-04 DIAGNOSIS — M17.0 PRIMARY OSTEOARTHRITIS OF BOTH KNEES: Primary | ICD-10-CM

## 2023-04-04 DIAGNOSIS — M12.811 ROTATOR CUFF TEAR ARTHROPATHY OF RIGHT SHOULDER: ICD-10-CM

## 2023-04-04 DIAGNOSIS — M75.101 ROTATOR CUFF TEAR ARTHROPATHY OF RIGHT SHOULDER: ICD-10-CM

## 2023-04-04 NOTE — PROGRESS NOTES
Negative Dial test.            Data Reviewed:      MRI left knee demonstrates ACL PCL and lateral meniscus to be intact. There is mucoid degeneration of the posterior horn of the medial meniscus without a displaced tear. There is a knee effusion and a popliteal cyst.  There are degenerative changes in the left knee           Minor Procedure:      Impression:   1. Primary osteoarthritis of both knees    2. Tear of medial meniscus of left knee, current, unspecified tear type, subsequent encounter    3. Right shoulder pain, unspecified chronicity    4. Rotator cuff tear arthropathy of right shoulder    5. Degenerative joint disease of right acromioclavicular joint       Hypertension  Hypercholesterolemia  Status post ORIF of a left wrist fracture by Dr. Melendrez Fort:   I discussed the problem with the patient. I discussed nonoperative versus operative intervention including injections. He does not require arthroscopic left knee intervention at this point. We discussed injections both cortisone and viscosupplementation. He would like to defer. He will continue rehabbing the left knee. We will observe him for now. I will recheck him back on an as-needed basis    4. Chronic illness with exacerbation  Follow up: Return if symptoms worsen or fail to improve.            Delisa Jacinto MD

## 2023-04-06 DIAGNOSIS — M25.562 LEFT KNEE PAIN, UNSPECIFIED CHRONICITY: ICD-10-CM

## 2023-04-20 ENCOUNTER — OFFICE VISIT (OUTPATIENT)
Dept: UROLOGY | Age: 73
End: 2023-04-20
Payer: MEDICARE

## 2023-04-20 DIAGNOSIS — R97.20 ELEVATED PSA: ICD-10-CM

## 2023-04-20 DIAGNOSIS — N52.9 ERECTILE DYSFUNCTION, UNSPECIFIED ERECTILE DYSFUNCTION TYPE: Primary | ICD-10-CM

## 2023-04-20 LAB
BILIRUBIN, URINE, POC: NEGATIVE
BLOOD URINE, POC: NORMAL
GLUCOSE URINE, POC: NEGATIVE
KETONES, URINE, POC: NEGATIVE
LEUKOCYTE ESTERASE, URINE, POC: NEGATIVE
NITRITE, URINE, POC: NEGATIVE
PH, URINE, POC: 6.5 (ref 4.6–8)
PROTEIN,URINE, POC: NEGATIVE
SPECIFIC GRAVITY, URINE, POC: 1.02 (ref 1–1.03)
URINALYSIS CLARITY, POC: NORMAL
URINALYSIS COLOR, POC: NORMAL
UROBILINOGEN, POC: NORMAL

## 2023-04-20 PROCEDURE — G8420 CALC BMI NORM PARAMETERS: HCPCS | Performed by: NURSE PRACTITIONER

## 2023-04-20 PROCEDURE — 81003 URINALYSIS AUTO W/O SCOPE: CPT | Performed by: NURSE PRACTITIONER

## 2023-04-20 PROCEDURE — 99214 OFFICE O/P EST MOD 30 MIN: CPT | Performed by: NURSE PRACTITIONER

## 2023-04-20 PROCEDURE — G8427 DOCREV CUR MEDS BY ELIG CLIN: HCPCS | Performed by: NURSE PRACTITIONER

## 2023-04-20 PROCEDURE — 1036F TOBACCO NON-USER: CPT | Performed by: NURSE PRACTITIONER

## 2023-04-20 PROCEDURE — 1123F ACP DISCUSS/DSCN MKR DOCD: CPT | Performed by: NURSE PRACTITIONER

## 2023-04-20 PROCEDURE — 3017F COLORECTAL CA SCREEN DOC REV: CPT | Performed by: NURSE PRACTITIONER

## 2023-04-20 RX ORDER — TADALAFIL 20 MG/1
20 TABLET ORAL
Qty: 20 TABLET | Refills: 5 | Status: SHIPPED | OUTPATIENT
Start: 2023-04-20

## 2023-04-20 RX ORDER — TADALAFIL 20 MG/1
20 TABLET ORAL DAILY PRN
Qty: 20 TABLET | Refills: 5 | Status: SHIPPED | OUTPATIENT
Start: 2023-04-20 | End: 2023-04-20

## 2023-04-20 ASSESSMENT — ENCOUNTER SYMPTOMS
BACK PAIN: 0
NAUSEA: 0

## 2023-04-20 NOTE — PROGRESS NOTES
Nitrite, Urine, POC Negative Negative    Leukocyte Esterase, Urine, POC Negative Negative       UA - Micro  WBC - 0  RBC - 0  Bacteria - 0  Epith - 0    PHYSICAL EXAM    General appearance - well appearing and in no distress  Mental status - alert, oriented to person, place, and time  Neck - supple, no significant adenopathy  Chest/Lung-  Quiet, even and easy respiratory effort without use of accessory muscles  Skin - normal coloration and turgor, no rashes      Assessment and Plan    ICD-10-CM    1. Erectile dysfunction, unspecified erectile dysfunction type  N52.9 AMB POC URINALYSIS DIP STICK AUTO W/O MICRO      2. Elevated PSA  R97.20           ED- We discussed the potential comorbidities that can lead to erectile dysfunction. We discussed treatment options including PO PDE 5 inhibitors, trimix and PGE1 intracorporeal injection, Muse, penile pump, and inflatable penile prosthesis. Pt. has decided to move forward with Cialis 20 mg PO q 72 hr PRN ED. Elev PSA- urine normal. Voiding well. Repeat PSA due in 3 months. Keep OV as arranged. Advised to call sooner if needed. Sonu Nielsen is supervising physician today and he approves plan of care.

## 2023-06-30 ENCOUNTER — APPOINTMENT (RX ONLY)
Dept: URBAN - METROPOLITAN AREA CLINIC 329 | Facility: CLINIC | Age: 73
Setting detail: DERMATOLOGY
End: 2023-06-30

## 2023-06-30 DIAGNOSIS — L57.0 ACTINIC KERATOSIS: ICD-10-CM

## 2023-06-30 DIAGNOSIS — B07.8 OTHER VIRAL WARTS: ICD-10-CM

## 2023-06-30 DIAGNOSIS — L81.4 OTHER MELANIN HYPERPIGMENTATION: ICD-10-CM

## 2023-06-30 DIAGNOSIS — D22 MELANOCYTIC NEVI: ICD-10-CM

## 2023-06-30 PROBLEM — D48.5 NEOPLASM OF UNCERTAIN BEHAVIOR OF SKIN: Status: ACTIVE | Noted: 2023-06-30

## 2023-06-30 PROBLEM — D22.5 MELANOCYTIC NEVI OF TRUNK: Status: ACTIVE | Noted: 2023-06-30

## 2023-06-30 PROCEDURE — ? EDUCATIONAL RESOURCES PROVIDED

## 2023-06-30 PROCEDURE — ? SUNSCREEN RECOMMENDATIONS

## 2023-06-30 PROCEDURE — 69100 BIOPSY OF EXTERNAL EAR: CPT | Mod: 59

## 2023-06-30 PROCEDURE — ? FULL BODY SKIN EXAM - DECLINED

## 2023-06-30 PROCEDURE — ? COUNSELING

## 2023-06-30 PROCEDURE — ? ADDITIONAL NOTES

## 2023-06-30 PROCEDURE — 17000 DESTRUCT PREMALG LESION: CPT | Mod: 59

## 2023-06-30 PROCEDURE — ? LIQUID NITROGEN

## 2023-06-30 PROCEDURE — 17003 DESTRUCT PREMALG LES 2-14: CPT | Mod: 59

## 2023-06-30 PROCEDURE — 17110 DESTRUCTION B9 LES UP TO 14: CPT

## 2023-06-30 PROCEDURE — ? BIOPSY BY SHAVE METHOD

## 2023-06-30 PROCEDURE — 99213 OFFICE O/P EST LOW 20 MIN: CPT | Mod: 25

## 2023-06-30 ASSESSMENT — LOCATION SIMPLE DESCRIPTION DERM
LOCATION SIMPLE: ABDOMEN
LOCATION SIMPLE: POSTERIOR SCALP
LOCATION SIMPLE: RIGHT UPPER ARM
LOCATION SIMPLE: RIGHT OCCIPITAL SCALP
LOCATION SIMPLE: LEFT UPPER BACK
LOCATION SIMPLE: UPPER BACK
LOCATION SIMPLE: SCALP
LOCATION SIMPLE: LEFT MIDDLE FINGER
LOCATION SIMPLE: RIGHT FOREHEAD
LOCATION SIMPLE: LEFT UPPER ARM

## 2023-06-30 ASSESSMENT — LOCATION DETAILED DESCRIPTION DERM
LOCATION DETAILED: RIGHT LATERAL FOREHEAD
LOCATION DETAILED: POSTERIOR MID-PARIETAL SCALP
LOCATION DETAILED: LEFT PROXIMAL POSTERIOR UPPER ARM
LOCATION DETAILED: RIGHT SUPERIOR PARIETAL SCALP
LOCATION DETAILED: RIGHT SUPERIOR OCCIPITAL SCALP
LOCATION DETAILED: EPIGASTRIC SKIN
LOCATION DETAILED: RIGHT SUPERIOR FOREHEAD
LOCATION DETAILED: LEFT SUPERIOR PARIETAL SCALP
LOCATION DETAILED: LEFT DISTAL RADIAL PALMAR MIDDLE FINGER
LOCATION DETAILED: RIGHT DISTAL POSTERIOR UPPER ARM
LOCATION DETAILED: RIGHT SUPERIOR MEDIAL FOREHEAD
LOCATION DETAILED: LEFT SUPERIOR UPPER BACK
LOCATION DETAILED: INFERIOR THORACIC SPINE
LOCATION DETAILED: RIGHT INFERIOR FOREHEAD

## 2023-06-30 ASSESSMENT — TOTAL NUMBER OF VERRUCA VULGARIS: # OF LESIONS?: 1

## 2023-06-30 ASSESSMENT — LOCATION ZONE DERM
LOCATION ZONE: ARM
LOCATION ZONE: FINGER
LOCATION ZONE: FACE
LOCATION ZONE: SCALP
LOCATION ZONE: TRUNK

## 2023-06-30 NOTE — PROCEDURE: LIQUID NITROGEN
Render Note In Bullet Format When Appropriate: No
Show Applicator Variable?: Yes
Duration Of Freeze Thaw-Cycle (Seconds): 0
Post-Care Instructions: I reviewed with the patient in detail post-care instructions. Patient is to wear sunprotection, and avoid picking at any of the treated lesions. Pt may apply Vaseline to crusted or scabbing areas.
Detail Level: Detailed
Consent: The patient's consent was obtained including but not limited to risks of crusting, scabbing, blistering, scarring, darker or lighter pigmentary change, recurrence, incomplete removal and infection.
Spray Paint Text: The liquid nitrogen was applied to the skin utilizing a spray paint frosting technique.
Medical Necessity Information: It is in your best interest to select a reason for this procedure from the list below. All of these items fulfill various CMS LCD requirements except the new and changing color options.
Medical Necessity Clause: This procedure was medically necessary because the lesions that were treated were: bleeding and enlarging

## 2023-07-31 ENCOUNTER — NURSE ONLY (OUTPATIENT)
Dept: UROLOGY | Age: 73
End: 2023-07-31

## 2023-07-31 DIAGNOSIS — R97.20 ELEVATED PSA: ICD-10-CM

## 2023-08-01 LAB — PSA SERPL-MCNC: 8 NG/ML

## 2023-08-07 ENCOUNTER — OFFICE VISIT (OUTPATIENT)
Dept: UROLOGY | Age: 73
End: 2023-08-07
Payer: MEDICARE

## 2023-08-07 DIAGNOSIS — R97.20 ELEVATED PSA: Primary | ICD-10-CM

## 2023-08-07 DIAGNOSIS — N52.9 ERECTILE DYSFUNCTION, UNSPECIFIED ERECTILE DYSFUNCTION TYPE: ICD-10-CM

## 2023-08-07 LAB
BILIRUBIN, URINE, POC: NEGATIVE
BLOOD URINE, POC: NORMAL
GLUCOSE URINE, POC: NEGATIVE
KETONES, URINE, POC: NEGATIVE
LEUKOCYTE ESTERASE, URINE, POC: NEGATIVE
NITRITE, URINE, POC: NEGATIVE
PH, URINE, POC: 7 (ref 4.6–8)
PROTEIN,URINE, POC: NEGATIVE
SPECIFIC GRAVITY, URINE, POC: 1.02 (ref 1–1.03)
URINALYSIS CLARITY, POC: NORMAL
URINALYSIS COLOR, POC: NORMAL
UROBILINOGEN, POC: NORMAL

## 2023-08-07 PROCEDURE — 81003 URINALYSIS AUTO W/O SCOPE: CPT | Performed by: UROLOGY

## 2023-08-07 PROCEDURE — 3017F COLORECTAL CA SCREEN DOC REV: CPT | Performed by: UROLOGY

## 2023-08-07 PROCEDURE — 1123F ACP DISCUSS/DSCN MKR DOCD: CPT | Performed by: UROLOGY

## 2023-08-07 PROCEDURE — G8420 CALC BMI NORM PARAMETERS: HCPCS | Performed by: UROLOGY

## 2023-08-07 PROCEDURE — 1036F TOBACCO NON-USER: CPT | Performed by: UROLOGY

## 2023-08-07 PROCEDURE — 99214 OFFICE O/P EST MOD 30 MIN: CPT | Performed by: UROLOGY

## 2023-08-07 PROCEDURE — G8427 DOCREV CUR MEDS BY ELIG CLIN: HCPCS | Performed by: UROLOGY

## 2023-08-07 ASSESSMENT — ENCOUNTER SYMPTOMS
COUGH: 0
CONSTIPATION: 0
VOMITING: 0
INDIGESTION: 0
SKIN LESIONS: 0
NAUSEA: 0
ABDOMINAL PAIN: 0
DIARRHEA: 0
BLOOD IN STOOL: 0
SHORTNESS OF BREATH: 0
WHEEZING: 0
BACK PAIN: 0
EYE DISCHARGE: 0
HEARTBURN: 0
EYE PAIN: 0

## 2023-08-07 NOTE — PROGRESS NOTES
[facility]  [address]  : 1950    Chief Complaint   Patient presents with    Follow-up          HPI     Rich Jacques is a 81 y.o. male with a PMH of elevated PSA s/p negative TRUS biopsy 2012 and ED on sildenafil who returns for follow up. Previously followed with Dr. George Angela. Last seen 2022. PSA baseline ~5. PSA up to 8 this year from 6.3 last year. He is voiding well without bothersome urgency, frequency, urinary retention, hematuria, urinary incontinence. He is not on any prostate meds. No known cause for PSA rise. In 2022, he changed to cialis 20 mg PRN because Sildenafil was no longer working well for his ED. [employer] works  [facility] for him.      Lab Results   Component Value Date    PSA 8.0 (H) 2023    PSA 6.3 (H) 2022    PSA 5.3 (H) 2021    PSA 5.1 (H) 2020     Past Medical History:   Diagnosis Date    Abdominal pain, other specified site     Acute sinusitis, unspecified     Calculus of ureter     Elevated prostate specific antigen (PSA)     GERD (gastroesophageal reflux disease)     Hypercholesterolemia     Hyperparathyroidism, unspecified ([address])     Kidney stones      Past Surgical History:   Procedure Laterality Date    COLONOSCOPY      LITHOTRIPSY      ORTHOPEDIC SURGERY Right     knee surgery    ORTHOPEDIC SURGERY Right     ankle    OTHER SURGICAL HISTORY      ESWL     Current Outpatient Medications   Medication Sig Dispense Refill    atorvastatin (LIPITOR) 20 MG tablet Take by mouth      ibuprofen (ADVIL;MOTRIN) 200 MG tablet Take by mouth      tadalafil (CIALIS) 20 MG tablet Take 1 tablet by mouth every 72 hours as needed for Erectile Dysfunction 20 tablet 5    diclofenac sodium (VOLTAREN) 1 % GEL Apply 2 g topically 2 times daily as needed for Pain 100 g 2    diclofenac (VOLTAREN) 75 MG EC tablet Take 1 tablet by mouth 2 times daily 60 tablet 0    diclofenac sodium

## 2023-08-22 ENCOUNTER — APPOINTMENT (RX ONLY)
Dept: URBAN - METROPOLITAN AREA CLINIC 329 | Facility: CLINIC | Age: 73
Setting detail: DERMATOLOGY
End: 2023-08-22

## 2023-08-22 PROBLEM — D04.21 CARCINOMA IN SITU OF SKIN OF RIGHT EAR AND EXTERNAL AURICULAR CANAL: Status: ACTIVE | Noted: 2023-08-22

## 2023-08-22 PROCEDURE — ? CURETTAGE AND DESTRUCTION

## 2023-08-22 PROCEDURE — 17282 DSTR MAL LS F/E/E/N/L/M1.1-2: CPT

## 2023-08-22 NOTE — PROCEDURE: CURETTAGE AND DESTRUCTION
Detail Level: Detailed
Number Of Curettages: 3
Size Of Lesion In Cm: 0.9
Size Of Lesion After Curettage: 1.1
Add Intralesional Injection: No
Concentration (Mg/Ml Or Millions Of Plaque Forming Units/Cc): 0.01
Total Volume (Ccs): 1
Anesthesia Type: 1% lidocaine with epinephrine
Cautery Type: electrodesiccation
What Was Performed First?: Curettage
Final Size Statement: The size of the lesion after curettage was
Additional Information: (Optional): The wound was cleaned, and a pressure dressing was applied.  The patient received detailed post-op instructions.
Consent was obtained from the patient. The risks, benefits and alternatives to therapy were discussed in detail. Specifically, the risks of infection, scarring, bleeding, prolonged wound healing, nerve injury, incomplete removal, allergy to anesthesia and recurrence were addressed. Alternatives to ED&C, such as: surgical removal and XRT were also discussed.  Prior to the procedure, the treatment site was clearly identified and confirmed by the patient.
Post-Care Instructions: I reviewed with the patient in detail post-care instructions. Patient is to keep the area dry for 48 hours, and not to engage in any swimming until the area is healed. Should the patient develop any fevers, chills, bleeding, severe pain patient will contact the office immediately.
Bill As A Line Item Or As Units: Line Item

## 2023-08-22 NOTE — HPI: PROCEDURE (SKIN SURGERY)
Has The Growth Been Previously Biopsied?: has been previously biopsied [id number]: previous_biopsy_has_been_previously_biopsied

## 2023-08-23 ENCOUNTER — HOSPITAL ENCOUNTER (OUTPATIENT)
Dept: MRI IMAGING | Age: 73
Discharge: HOME | End: 2023-08-26
Attending: UROLOGY
Payer: MEDICARE

## 2023-08-23 DIAGNOSIS — R97.20 ELEVATED PSA: ICD-10-CM

## 2023-08-23 PROCEDURE — A9579 GAD-BASE MR CONTRAST NOS,1ML: HCPCS | Performed by: UROLOGY

## 2023-08-23 PROCEDURE — 6360000004 HC RX CONTRAST MEDICATION: Performed by: UROLOGY

## 2023-08-23 PROCEDURE — 2580000003 HC RX 258: Performed by: UROLOGY

## 2023-08-23 PROCEDURE — 72197 MRI PELVIS W/O & W/DYE: CPT

## 2023-08-23 RX ORDER — SODIUM CHLORIDE 0.9 % (FLUSH) 0.9 %
30 SYRINGE (ML) INJECTION AS NEEDED
Status: DISCONTINUED | OUTPATIENT
Start: 2023-08-23 | End: 2023-08-27 | Stop reason: HOSPADM

## 2023-08-23 RX ADMIN — GADOTERIDOL 15 ML: 279.3 INJECTION, SOLUTION INTRAVENOUS at 20:19

## 2023-08-23 RX ADMIN — SODIUM CHLORIDE, PRESERVATIVE FREE 30 ML: 5 INJECTION INTRAVENOUS at 20:18

## 2023-08-25 ENCOUNTER — TELEPHONE (OUTPATIENT)
Dept: UROLOGY | Age: 73
End: 2023-08-25

## 2023-08-25 DIAGNOSIS — R97.20 ELEVATED PSA: Primary | ICD-10-CM

## 2023-08-25 RX ORDER — CIPROFLOXACIN 500 MG/1
500 TABLET, FILM COATED ORAL 2 TIMES DAILY
Qty: 2 TABLET | Refills: 0 | Status: SHIPPED | OUTPATIENT
Start: 2023-08-25 | End: 2023-08-26

## 2023-08-25 RX ORDER — ENEMA 19; 7 G/133ML; G/133ML
1 ENEMA RECTAL ONCE
Qty: 1 EACH | Refills: 0 | Status: SHIPPED | OUTPATIENT
Start: 2023-08-25 | End: 2023-08-25

## 2023-08-25 NOTE — TELEPHONE ENCOUNTER
----- Message from Shanel Phillips MD sent at 2023 12:18 PM EDT -----  [address]: [facility]    Surgeon: Paul Eller    Assist: NONE    Diagnosis: Elevated PSA    Procedure: MRI Fusion Prostate Biopsy     Posting time: 30 minutes    Special Instruments Needed:  NONE    Anesthesia MAC    Labs: U/A    Tests: EKG per anesthesia    Blood: NONE    Bowel Prep: NONE    Special Instructions: Cipro/enema sent to pharmacy    Orders/HandP:     Follow up appointment: 2 weeks TRUS talk appointment

## 2023-08-25 NOTE — RESULT ENCOUNTER NOTE
Called patient with mri results. He was MRI prostate biopsy.  will call. Cipro/enema sent to pharmacy. Yaya Tinoco M.D.     [facility]  [facility], [address] [place] [address]  Phone: [phone]  Fax: [phone]

## 2023-08-28 NOTE — TELEPHONE ENCOUNTER
Called and spoke with the patient. Request sent to scheduling for [facility] 10/5/23.

## 2023-08-28 NOTE — TELEPHONE ENCOUNTER
Procedures: Procedure(s):   MRI FUSION PROSTATE BIOPSY   Date: 10/5/2023   Time: 1330   Location: [facility] MAIN OR 02     Scheduled. Please complete orders.

## 2023-09-07 ENCOUNTER — TELEPHONE (OUTPATIENT)
Dept: UROLOGY | Age: 73
End: 2023-09-07

## 2023-10-04 ENCOUNTER — ANESTHESIA EVENT (OUTPATIENT)
Dept: SURGERY | Age: 73
End: 2023-10-04
Payer: MEDICARE

## 2023-10-04 NOTE — PERIOP NOTE
Spoke with the patient's wife. Patient is flying home for [place] and will arrive in [place] at 5:30 today. No health care POA on file.

## 2023-10-05 ENCOUNTER — ANESTHESIA (OUTPATIENT)
Dept: SURGERY | Age: 73
End: 2023-10-05
Payer: MEDICARE

## 2023-10-05 ENCOUNTER — HOSPITAL ENCOUNTER (OUTPATIENT)
Age: 73
Setting detail: OUTPATIENT SURGERY
Discharge: HOME | End: 2023-10-05
Attending: UROLOGY | Admitting: UROLOGY
Payer: MEDICARE

## 2023-10-05 VITALS
TEMPERATURE: 98.2 F | OXYGEN SATURATION: 100 % | SYSTOLIC BLOOD PRESSURE: 121 MMHG | BODY MASS INDEX: 24.45 KG/M2 | DIASTOLIC BLOOD PRESSURE: 71 MMHG | RESPIRATION RATE: 16 BRPM | HEART RATE: 56 BPM | HEIGHT: 70 IN | WEIGHT: 170.8 LBS

## 2023-10-05 LAB
APPEARANCE UR: CLEAR
BACTERIA URNS QL MICRO: NEGATIVE /HPF
BILIRUB UR QL: NEGATIVE
CASTS URNS QL MICRO: ABNORMAL /LPF
COLOR UR: ABNORMAL
EPI CELLS #/AREA URNS HPF: ABNORMAL /HPF
GLUCOSE UR STRIP.AUTO-MCNC: NEGATIVE MG/DL
HGB UR QL STRIP: NEGATIVE
KETONES UR QL STRIP.AUTO: ABNORMAL MG/DL
LEUKOCYTE ESTERASE UR QL STRIP.AUTO: NEGATIVE
NITRITE UR QL STRIP.AUTO: NEGATIVE
PH UR STRIP: 6 (ref 5–9)
PROT UR STRIP-MCNC: ABNORMAL MG/DL
RBC #/AREA URNS HPF: ABNORMAL /HPF
SP GR UR REFRACTOMETRY: 1.02 (ref 1–1.02)
UROBILINOGEN UR QL STRIP.AUTO: 1 EU/DL (ref 0.2–1)
WBC URNS QL MICRO: ABNORMAL /HPF

## 2023-10-05 PROCEDURE — 3600000002 HC SURGERY LEVEL 2 BASE: Performed by: UROLOGY

## 2023-10-05 PROCEDURE — 81001 URINALYSIS AUTO W/SCOPE: CPT

## 2023-10-05 PROCEDURE — 6370000000 HC RX 637 (ALT 250 FOR IP): Performed by: ANESTHESIOLOGY

## 2023-10-05 PROCEDURE — 2580000003 HC RX 258: Performed by: UROLOGY

## 2023-10-05 PROCEDURE — 6360000002 HC RX W HCPCS: Performed by: NURSE ANESTHETIST, CERTIFIED REGISTERED

## 2023-10-05 PROCEDURE — 3700000001 HC ADD 15 MINUTES (ANESTHESIA): Performed by: UROLOGY

## 2023-10-05 PROCEDURE — 3600000012 HC SURGERY LEVEL 2 ADDTL 15MIN: Performed by: UROLOGY

## 2023-10-05 PROCEDURE — 76872 US TRANSRECTAL: CPT | Performed by: UROLOGY

## 2023-10-05 PROCEDURE — 3700000000 HC ANESTHESIA ATTENDED CARE: Performed by: UROLOGY

## 2023-10-05 PROCEDURE — 2500000003 HC RX 250 WO HCPCS: Performed by: NURSE ANESTHETIST, CERTIFIED REGISTERED

## 2023-10-05 PROCEDURE — 2709999900 HC NON-CHARGEABLE SUPPLY: Performed by: UROLOGY

## 2023-10-05 PROCEDURE — 2580000003 HC RX 258: Performed by: ANESTHESIOLOGY

## 2023-10-05 PROCEDURE — 7100000001 HC PACU RECOVERY - ADDTL 15 MIN: Performed by: UROLOGY

## 2023-10-05 PROCEDURE — 55700 PR PROSTATE NEEDLE BIOPSY ANY APPROACH: CPT | Performed by: UROLOGY

## 2023-10-05 PROCEDURE — 7100000010 HC PHASE II RECOVERY - FIRST 15 MIN: Performed by: UROLOGY

## 2023-10-05 PROCEDURE — 7100000000 HC PACU RECOVERY - FIRST 15 MIN: Performed by: UROLOGY

## 2023-10-05 PROCEDURE — 6360000002 HC RX W HCPCS: Performed by: UROLOGY

## 2023-10-05 PROCEDURE — 88305 TISSUE EXAM BY PATHOLOGIST: CPT

## 2023-10-05 RX ORDER — SODIUM CHLORIDE 9 MG/ML
INJECTION, SOLUTION INTRAVENOUS PRN
Status: DISCONTINUED | OUTPATIENT
Start: 2023-10-05 | End: 2023-10-05 | Stop reason: HOSPADM

## 2023-10-05 RX ORDER — ONDANSETRON 2 MG/ML
4 INJECTION INTRAMUSCULAR; INTRAVENOUS
Status: DISCONTINUED | OUTPATIENT
Start: 2023-10-05 | End: 2023-10-05 | Stop reason: HOSPADM

## 2023-10-05 RX ORDER — LIDOCAINE HYDROCHLORIDE 20 MG/ML
INJECTION, SOLUTION EPIDURAL; INFILTRATION; INTRACAUDAL; PERINEURAL PRN
Status: DISCONTINUED | OUTPATIENT
Start: 2023-10-05 | End: 2023-10-05 | Stop reason: SDUPTHER

## 2023-10-05 RX ORDER — SODIUM CHLORIDE 0.9 % (FLUSH) 0.9 %
5-40 SYRINGE (ML) INJECTION PRN
Status: DISCONTINUED | OUTPATIENT
Start: 2023-10-05 | End: 2023-10-05 | Stop reason: HOSPADM

## 2023-10-05 RX ORDER — ACETAMINOPHEN 500 MG
500 TABLET ORAL ONCE
Status: COMPLETED | OUTPATIENT
Start: 2023-10-05 | End: 2023-10-05

## 2023-10-05 RX ORDER — PROPOFOL 10 MG/ML
INJECTION, EMULSION INTRAVENOUS CONTINUOUS PRN
Status: DISCONTINUED | OUTPATIENT
Start: 2023-10-05 | End: 2023-10-05 | Stop reason: SDUPTHER

## 2023-10-05 RX ORDER — LIDOCAINE HYDROCHLORIDE 10 MG/ML
1 INJECTION, SOLUTION INFILTRATION; PERINEURAL
Status: DISCONTINUED | OUTPATIENT
Start: 2023-10-05 | End: 2023-10-05 | Stop reason: HOSPADM

## 2023-10-05 RX ORDER — EPHEDRINE SULFATE/0.9% NACL/PF 50 MG/5 ML
SYRINGE (ML) INTRAVENOUS PRN
Status: DISCONTINUED | OUTPATIENT
Start: 2023-10-05 | End: 2023-10-05 | Stop reason: SDUPTHER

## 2023-10-05 RX ORDER — PROCHLORPERAZINE EDISYLATE 5 MG/ML
5 INJECTION INTRAMUSCULAR; INTRAVENOUS
Status: DISCONTINUED | OUTPATIENT
Start: 2023-10-05 | End: 2023-10-05 | Stop reason: HOSPADM

## 2023-10-05 RX ORDER — SODIUM CHLORIDE, SODIUM LACTATE, POTASSIUM CHLORIDE, CALCIUM CHLORIDE 600; 310; 30; 20 MG/100ML; MG/100ML; MG/100ML; MG/100ML
INJECTION, SOLUTION INTRAVENOUS CONTINUOUS
Status: DISCONTINUED | OUTPATIENT
Start: 2023-10-05 | End: 2023-10-05 | Stop reason: HOSPADM

## 2023-10-05 RX ORDER — MIDAZOLAM HYDROCHLORIDE 1 MG/ML
INJECTION INTRAMUSCULAR; INTRAVENOUS PRN
Status: DISCONTINUED | OUTPATIENT
Start: 2023-10-05 | End: 2023-10-05 | Stop reason: SDUPTHER

## 2023-10-05 RX ORDER — SODIUM CHLORIDE 0.9 % (FLUSH) 0.9 %
5-40 SYRINGE (ML) INJECTION EVERY 12 HOURS SCHEDULED
Status: DISCONTINUED | OUTPATIENT
Start: 2023-10-05 | End: 2023-10-05 | Stop reason: HOSPADM

## 2023-10-05 RX ADMIN — MIDAZOLAM 2 MG: 1 INJECTION INTRAMUSCULAR; INTRAVENOUS at 14:15

## 2023-10-05 RX ADMIN — LIDOCAINE HYDROCHLORIDE 50 MG: 20 INJECTION, SOLUTION EPIDURAL; INFILTRATION; INTRACAUDAL; PERINEURAL at 14:22

## 2023-10-05 RX ADMIN — ACETAMINOPHEN 500 MG: 500 TABLET, FILM COATED ORAL at 12:07

## 2023-10-05 RX ADMIN — PROPOFOL 160 MCG/KG/MIN: 10 INJECTION, EMULSION INTRAVENOUS at 14:22

## 2023-10-05 RX ADMIN — Medication 15 MG: at 14:29

## 2023-10-05 RX ADMIN — SODIUM CHLORIDE, POTASSIUM CHLORIDE, SODIUM LACTATE AND CALCIUM CHLORIDE: 600; 310; 30; 20 INJECTION, SOLUTION INTRAVENOUS at 12:04

## 2023-10-05 RX ADMIN — CEFTRIAXONE 1000 MG: 1 INJECTION, POWDER, FOR SOLUTION INTRAMUSCULAR; INTRAVENOUS at 14:22

## 2023-10-05 ASSESSMENT — PAIN - FUNCTIONAL ASSESSMENT
PAIN_FUNCTIONAL_ASSESSMENT: NONE - DENIES PAIN

## 2023-10-05 NOTE — ANESTHESIA POSTPROCEDURE EVALUATION
Department of Anesthesiology  Postprocedure Note    Patient: Essence Beltran MRN: [id number]  YOB: 1950  Date of evaluation: 10/5/2023      Procedure Summary     Date: 10/05/23 Room / Location: [facility] MAIN OR  / [facility] MAIN OR    Anesthesia Start: 4349 Anesthesia Stop: 3923    Procedure: MRI FUSION PROSTATE BIOPSY (Rectum) Diagnosis:       Elevated PSA      (Elevated PSA [R97.20])    Providers:  Tara Echols MD Responsible Provider: Mariah Stafford MD    Anesthesia Type: TIVA ASA Status: 2          Anesthesia Type: TIVA    Janina Phase I: Janina Score: 9    Janina Phase II: Janina Score: 10      Anesthesia Post Evaluation    Patient location during evaluation: PACU  Patient participation: complete - patient participated  Level of consciousness: awake and alert  Pain score: 1  Airway patency: patent  Nausea & Vomiting: no nausea  Complications: no  Cardiovascular status: blood pressure returned to baseline and hemodynamically stable  Respiratory status: acceptable  Hydration status: euvolemic  Multimodal analgesia pain management approach  Pain management: adequate and satisfactory to patient

## 2023-10-05 NOTE — DISCHARGE INSTRUCTIONS
If you have had surgery in the past 7-10 days by one of our providers and are having fever, bleeding, or drainage from an incision, have an opening in an incision, or having issues urinating properly, please call [phone]. Prostate Biopsy and Ultrasound:   A prostate biopsy is a type of test. Your doctor takes small tissue samples from your prostate gland. Then another doctor looks at the tissue under a microscope to see if there are cancer cells. This test is done by a doctor who specializes in men's genital and urinary problems (urologist). It can be done in your doctor's office, a day surgery clinic, or a hospital operating room. To get the tissue samples from the prostate, the doctor inserts a thin needle through the rectum, the urethra, or the area between the anus and scrotum (perineum). The most common method is through the rectum. Your doctor may use ultrasound to help guide the needle. What else should you know about this test?  A prostate biopsy has a slight risk of causing problems such as infection or bleeding. If the biopsy went through your rectum, you may have a small amount of bleeding from your rectum for 2 to 3 days after the biopsy. You may have a little pain in your pelvic area. You may also have a little blood in your urine for 1 to 5 days. You may have some blood in your semen for a week or longer. Do not do heavy work or exercise for 4 hours after the test.  Your doctor will tell you how long it may take to get your results back. Follow-up care is a key part of your treatment and safety. Be sure to make and go to all appointments, and call your doctor if you are having problems. It's also a good idea to keep a list of the medicines you take. Ask your doctor when you can expect to have your test results.     After general anesthesia or intravenous sedation, for 24 hours or while taking prescription Narcotics:  Limit your activities  A responsible adult needs to be with you for the

## 2023-10-05 NOTE — H&P
[address] [place] [address] Drive Urology H&P Note                                           10/05/23     Patient: Freddy Washington MRN: [id number]    Admission Date:  10/5/2023, 0  Admission Diagnosis: Elevated PSA [R97.20]  Reason for Consult: Elevated psA    ASSESSMENT: 81 y.o. male with elevated psa and mri prostate suspicious for prostate cancer who presents for MRI fusion prostate biopsy today. PLAN:  -to or for mri fusion prostate biopsy  -consented  -Antibiotic on call to OR  -NPO for procedure.       __________________________________________________________________________________    HPI:     Freddy Washington is a 81 y.o. male with elevated psA and mri prostate suspicious for prostate cancer who presents for MRI fusion prostate biopsy today. Non changes in medical history since last seen by me. Past Medical History:  Past Medical History:   Diagnosis Date    Abdominal pain, other specified site     Acute sinusitis, unspecified     Calculus of ureter     Elevated prostate specific antigen (PSA)     GERD (gastroesophageal reflux disease)     Hypercholesterolemia     Hyperparathyroidism, unspecified ([address])     Kidney stones        Past Surgical History:  Past Surgical History:   Procedure Laterality Date    COLONOSCOPY      LITHOTRIPSY      ORTHOPEDIC SURGERY Right     knee surgery    ORTHOPEDIC SURGERY Right     ankle    OTHER SURGICAL HISTORY      ESWL       Medications:  No current facility-administered medications on file prior to encounter.      Current Outpatient Medications on File Prior to Encounter   Medication Sig Dispense Refill    tadalafil (CIALIS) 20 MG tablet Take 1 tablet by mouth every 72 hours as needed for Erectile Dysfunction 20 tablet 5    diclofenac sodium (VOLTAREN) 1 % GEL Apply 2 g topically 2 times daily as needed for Pain 100 g 2    diclofenac (VOLTAREN) 75 MG EC tablet Take 1 tablet by mouth 2 times daily 60 tablet 0    diclofenac sodium

## 2023-10-05 NOTE — OP NOTE
Operative Note        Patient: Steff Skinner, [id number]    Date of Surgery: 10/05/23    Preoperative Diagnosis: Elevated PSA    Postoperative Diagnosis:  same    Surgeon(s) and Role:     * Ever Oliva MD - Primary     Anesthesia:  MAC     Procedure: Procedure(s):  Caden Ram MRI fused TRUS prostate biopsy     Indications:     Discussed the risk of surgery including infection, hematoma, bleeding, recurrence or persistence of symptoms or stone, and the risks of general anesthetic. The patient understands the risks, any and all questions were answered to the patient's satisfaction and they freely signed the consent for operation. URONAV MRI FUSED TRANSRECTAL ULTRASOUND GUIDED BIOPSY OF THE PROSTATE    All risks, benefits and alternatives were again reviewed and he is willing to proceed at this time. The patient was placed in the left lateral decubitus position. I then inserted the transrectal ultrasound probe into the rectum. The prostate was visualized. The prostate appeared homogenous in appearance. Ultrasonographic sweep of the prostate was performed to obtain images to link to MRI via URONAV. There were 1 regions of interest based upon MRI imaging. 3 biopsies of regions of interest were then obtained using the ultrasound images for guidance that had been linked to the previous MRI using Uronav. I then performed 12 needle core biopsies using a standard sextant biopsy format with traditional ultrasound images for guidance. The ultrasound probe was removed. The patient tolerated the procedure well. PROSTATE VOLUME:  74 cc    Estimated Blood Loss:  minimal    Specimens: prostate biopsies             Drains: none                 Implants: * No implants in log *    Yaya Swanson M.D.     [facility]  [address] [place] [address], [address] [place] [address]  Phone: [phone]  Fax: [phone]

## 2023-10-05 NOTE — ANESTHESIA PRE PROCEDURE
ASA 2       Induction: intravenous. Anesthetic plan and risks discussed with patient and spouse. Jimmy Perez MD   10/5/2023

## 2023-10-11 DIAGNOSIS — R97.20 ELEVATED PSA: Primary | ICD-10-CM

## 2023-10-11 RX ORDER — HYOSCYAMINE SULFATE 0.12 MG/1
1 TABLET SUBLINGUAL EVERY 4 HOURS PRN
Qty: 30 EACH | Refills: 1 | Status: SHIPPED | OUTPATIENT
Start: 2023-10-11

## 2023-10-12 NOTE — RESULT ENCOUNTER NOTE
Mr. Cadet Sic, your prostate biopsy shows NO cancer, only chronic inflammation in the prostate. This is good news and likely explains the PSA elevation. You do not need additional treatment at this time and we only need to continue to periodically check your PSA / rectal exam.  I recommend that we cancel our upcoming appointment and push it out for 6 months to recheck then. I will have my medical assistant contact you to set this up. Dimas Ivy, please cancel TRUS talk appointment and set up 6 month follow up with PSA prior    Please let me know if you have any additional questions/concerns.      Francia Cotton,  Dr. Lalita Mendenhall

## 2023-10-17 DIAGNOSIS — R97.20 ELEVATED PSA: Primary | ICD-10-CM

## 2024-01-03 ENCOUNTER — APPOINTMENT (RX ONLY)
Dept: URBAN - METROPOLITAN AREA CLINIC 329 | Facility: CLINIC | Age: 74
Setting detail: DERMATOLOGY
End: 2024-01-03

## 2024-01-03 DIAGNOSIS — D22 MELANOCYTIC NEVI: ICD-10-CM

## 2024-01-03 DIAGNOSIS — L57.0 ACTINIC KERATOSIS: ICD-10-CM

## 2024-01-03 DIAGNOSIS — L57.8 OTHER SKIN CHANGES DUE TO CHRONIC EXPOSURE TO NONIONIZING RADIATION: ICD-10-CM

## 2024-01-03 DIAGNOSIS — Z85.828 PERSONAL HISTORY OF OTHER MALIGNANT NEOPLASM OF SKIN: ICD-10-CM

## 2024-01-03 DIAGNOSIS — L82.1 OTHER SEBORRHEIC KERATOSIS: ICD-10-CM

## 2024-01-03 PROBLEM — D22.5 MELANOCYTIC NEVI OF TRUNK: Status: ACTIVE | Noted: 2024-01-03

## 2024-01-03 PROCEDURE — 17004 DESTROY PREMAL LESIONS 15/>: CPT

## 2024-01-03 PROCEDURE — ? LIQUID NITROGEN

## 2024-01-03 PROCEDURE — ? FULL BODY SKIN EXAM - DECLINED

## 2024-01-03 PROCEDURE — 99213 OFFICE O/P EST LOW 20 MIN: CPT | Mod: 25

## 2024-01-03 PROCEDURE — ? SUNSCREEN RECOMMENDATIONS

## 2024-01-03 PROCEDURE — ? COUNSELING

## 2024-01-03 ASSESSMENT — LOCATION ZONE DERM
LOCATION ZONE: ARM
LOCATION ZONE: SCALP
LOCATION ZONE: TRUNK
LOCATION ZONE: FACE
LOCATION ZONE: TRUNK
LOCATION ZONE: ARM
LOCATION ZONE: FACE
LOCATION ZONE: EAR
LOCATION ZONE: SCALP

## 2024-01-03 ASSESSMENT — LOCATION DETAILED DESCRIPTION DERM
LOCATION DETAILED: LEFT MEDIAL FRONTAL SCALP
LOCATION DETAILED: RIGHT CENTRAL FRONTAL SCALP
LOCATION DETAILED: LEFT MID-UPPER BACK
LOCATION DETAILED: LEFT VENTRAL PROXIMAL FOREARM
LOCATION DETAILED: SUPERIOR THORACIC SPINE
LOCATION DETAILED: PERIUMBILICAL SKIN
LOCATION DETAILED: LEFT SUPERIOR LATERAL FOREHEAD
LOCATION DETAILED: LEFT FOREHEAD
LOCATION DETAILED: EPIGASTRIC SKIN
LOCATION DETAILED: POSTERIOR MID-PARIETAL SCALP
LOCATION DETAILED: INFERIOR THORACIC SPINE
LOCATION DETAILED: LEFT FOREHEAD
LOCATION DETAILED: LEFT LATERAL FOREHEAD
LOCATION DETAILED: INFERIOR THORACIC SPINE
LOCATION DETAILED: RIGHT SUPERIOR FOREHEAD
LOCATION DETAILED: LEFT INFERIOR LATERAL FOREHEAD
LOCATION DETAILED: LEFT MEDIAL SUPERIOR CHEST
LOCATION DETAILED: LEFT MEDIAL INFERIOR CHEST
LOCATION DETAILED: LEFT INFERIOR MEDIAL MIDBACK
LOCATION DETAILED: RIGHT SUPERIOR PARIETAL SCALP
LOCATION DETAILED: RIGHT SUPERIOR MEDIAL FOREHEAD
LOCATION DETAILED: LEFT CENTRAL MALAR CHEEK
LOCATION DETAILED: RIGHT SUPERIOR CRUS OF ANTIHELIX
LOCATION DETAILED: RIGHT MEDIAL INFERIOR CHEST
LOCATION DETAILED: RIGHT FOREHEAD
LOCATION DETAILED: RIGHT MEDIAL FRONTAL SCALP
LOCATION DETAILED: RIGHT SUPERIOR PARIETAL SCALP
LOCATION DETAILED: LEFT CENTRAL FRONTAL SCALP
LOCATION DETAILED: LEFT SUPERIOR PARIETAL SCALP
LOCATION DETAILED: LEFT VENTRAL PROXIMAL FOREARM

## 2024-01-03 ASSESSMENT — LOCATION SIMPLE DESCRIPTION DERM
LOCATION SIMPLE: LEFT LOWER BACK
LOCATION SIMPLE: POSTERIOR SCALP
LOCATION SIMPLE: SCALP
LOCATION SIMPLE: LEFT SCALP
LOCATION SIMPLE: LEFT CHEEK
LOCATION SIMPLE: LEFT FOREARM
LOCATION SIMPLE: CHEST
LOCATION SIMPLE: LEFT FOREARM
LOCATION SIMPLE: SCALP
LOCATION SIMPLE: UPPER BACK
LOCATION SIMPLE: ABDOMEN
LOCATION SIMPLE: RIGHT FOREHEAD
LOCATION SIMPLE: ABDOMEN
LOCATION SIMPLE: RIGHT FOREHEAD
LOCATION SIMPLE: RIGHT EAR
LOCATION SIMPLE: UPPER BACK
LOCATION SIMPLE: LEFT FOREHEAD
LOCATION SIMPLE: RIGHT SCALP
LOCATION SIMPLE: LEFT UPPER BACK
LOCATION SIMPLE: LEFT FOREHEAD

## 2024-01-03 NOTE — HPI: EVALUATION OF SKIN LESION(S)
Hpi Title: Evaluation of a Skin Lesion
Have Your Spot(S) Been Treated In The Past?: has not been treated
Yes

## 2024-01-03 NOTE — PROCEDURE: LIQUID NITROGEN
IV discontinued. Discharge paperwork reviewed with patient and his wife. Escorted to lobby for transport home.  
Post-Care Instructions: I reviewed with the patient in detail post-care instructions. Patient is to wear sunprotection, and avoid picking at any of the treated lesions. Pt may apply Vaseline to crusted or scabbing areas.
Render Post-Care Instructions In Note?: no
Duration Of Freeze Thaw-Cycle (Seconds): 0
Show Aperture Variable?: Yes
Consent: The patient's consent was obtained including but not limited to risks of crusting, scabbing, blistering, scarring, darker or lighter pigmentary change, recurrence, incomplete removal and infection.
Detail Level: Detailed

## 2024-04-15 ENCOUNTER — NURSE ONLY (OUTPATIENT)
Dept: UROLOGY | Age: 74
End: 2024-04-15

## 2024-04-15 ENCOUNTER — APPOINTMENT (RX ONLY)
Dept: URBAN - METROPOLITAN AREA CLINIC 329 | Facility: CLINIC | Age: 74
Setting detail: DERMATOLOGY
End: 2024-04-15

## 2024-04-15 DIAGNOSIS — R97.20 ELEVATED PSA: ICD-10-CM

## 2024-04-15 DIAGNOSIS — L57.0 ACTINIC KERATOSIS: ICD-10-CM | Status: INADEQUATELY CONTROLLED

## 2024-04-15 DIAGNOSIS — L57.8 OTHER SKIN CHANGES DUE TO CHRONIC EXPOSURE TO NONIONIZING RADIATION: ICD-10-CM

## 2024-04-15 LAB — PSA SERPL-MCNC: 9.4 NG/ML

## 2024-04-15 PROCEDURE — ? SUNSCREEN RECOMMENDATIONS

## 2024-04-15 PROCEDURE — 17004 DESTROY PREMAL LESIONS 15/>: CPT

## 2024-04-15 PROCEDURE — ? FULL BODY SKIN EXAM - DECLINED

## 2024-04-15 PROCEDURE — ? COUNSELING

## 2024-04-15 PROCEDURE — 99213 OFFICE O/P EST LOW 20 MIN: CPT | Mod: 25

## 2024-04-15 PROCEDURE — ? LIQUID NITROGEN

## 2024-04-15 ASSESSMENT — LOCATION SIMPLE DESCRIPTION DERM
LOCATION SIMPLE: LEFT SCALP
LOCATION SIMPLE: RIGHT FOREHEAD
LOCATION SIMPLE: SCALP
LOCATION SIMPLE: RIGHT SCALP
LOCATION SIMPLE: ANTERIOR SCALP
LOCATION SIMPLE: LEFT FOREHEAD
LOCATION SIMPLE: LEFT CHEEK
LOCATION SIMPLE: POSTERIOR SCALP
LOCATION SIMPLE: RIGHT CHEEK

## 2024-04-15 ASSESSMENT — LOCATION DETAILED DESCRIPTION DERM
LOCATION DETAILED: MID-FRONTAL SCALP
LOCATION DETAILED: RIGHT MEDIAL FRONTAL SCALP
LOCATION DETAILED: LEFT MEDIAL FRONTAL SCALP
LOCATION DETAILED: LEFT CENTRAL FRONTAL SCALP
LOCATION DETAILED: LEFT SUPERIOR PARIETAL SCALP
LOCATION DETAILED: LEFT SUPERIOR FOREHEAD
LOCATION DETAILED: POSTERIOR MID-PARIETAL SCALP
LOCATION DETAILED: LEFT CENTRAL MALAR CHEEK
LOCATION DETAILED: RIGHT CENTRAL MALAR CHEEK
LOCATION DETAILED: RIGHT SUPERIOR FOREHEAD
LOCATION DETAILED: MID-OCCIPITAL SCALP
LOCATION DETAILED: LEFT SUPERIOR OCCIPITAL SCALP
LOCATION DETAILED: RIGHT CENTRAL FRONTAL SCALP

## 2024-04-15 ASSESSMENT — LOCATION ZONE DERM
LOCATION ZONE: FACE
LOCATION ZONE: SCALP

## 2024-04-15 NOTE — HPI: SKIN LESION
What Type Of Note Output Would You Prefer (Optional)?: Bullet Format
How Severe Is Your Skin Lesion?: mild
Has Your Skin Lesion Been Treated?: not been treated
Is This A New Presentation, Or A Follow-Up?: Skin Lesion
Additional History: Patient reports that he has a new skin lesion on his scalp that he would like to have evaluated. He explains that he has had this area treated with LN2 in the past and was told to come back if these skin lesions reappeared.

## 2024-05-29 ENCOUNTER — OFFICE VISIT (OUTPATIENT)
Dept: UROLOGY | Age: 74
End: 2024-05-29
Payer: MEDICARE

## 2024-05-29 DIAGNOSIS — R97.20 ELEVATED PSA: ICD-10-CM

## 2024-05-29 DIAGNOSIS — N52.9 ERECTILE DYSFUNCTION, UNSPECIFIED ERECTILE DYSFUNCTION TYPE: Primary | ICD-10-CM

## 2024-05-29 LAB
BILIRUBIN, URINE, POC: NEGATIVE
BLOOD URINE, POC: NEGATIVE
GLUCOSE URINE, POC: NEGATIVE
KETONES, URINE, POC: NEGATIVE
LEUKOCYTE ESTERASE, URINE, POC: NEGATIVE
NITRITE, URINE, POC: NEGATIVE
PH, URINE, POC: 7 (ref 4.6–8)
PROTEIN,URINE, POC: NEGATIVE
SPECIFIC GRAVITY, URINE, POC: 1.02 (ref 1–1.03)
URINALYSIS CLARITY, POC: NORMAL
URINALYSIS COLOR, POC: NORMAL
UROBILINOGEN, POC: NORMAL

## 2024-05-29 PROCEDURE — 1123F ACP DISCUSS/DSCN MKR DOCD: CPT | Performed by: UROLOGY

## 2024-05-29 PROCEDURE — G8420 CALC BMI NORM PARAMETERS: HCPCS | Performed by: UROLOGY

## 2024-05-29 PROCEDURE — 99214 OFFICE O/P EST MOD 30 MIN: CPT | Performed by: UROLOGY

## 2024-05-29 PROCEDURE — 81003 URINALYSIS AUTO W/O SCOPE: CPT | Performed by: UROLOGY

## 2024-05-29 PROCEDURE — 3017F COLORECTAL CA SCREEN DOC REV: CPT | Performed by: UROLOGY

## 2024-05-29 PROCEDURE — G8427 DOCREV CUR MEDS BY ELIG CLIN: HCPCS | Performed by: UROLOGY

## 2024-05-29 PROCEDURE — 1036F TOBACCO NON-USER: CPT | Performed by: UROLOGY

## 2024-05-29 RX ORDER — TADALAFIL 20 MG/1
20 TABLET ORAL
Qty: 60 TABLET | Refills: 5 | Status: SHIPPED | OUTPATIENT
Start: 2024-05-29

## 2024-05-29 ASSESSMENT — ENCOUNTER SYMPTOMS
BACK PAIN: 0
NAUSEA: 0

## 2024-05-29 NOTE — PROGRESS NOTES
history of urolithiasis, nocturia, slower stream, straining, urgency, leakage w/ urge, frequent urination, incomplete emptying, testicular pain, sexually transmitted disease, discharge and urethral stricture.  Musculoskeletal:  Negative for back pain, bone pain, arthralgias, tenderness, muscle weakness and neck pain.  Neurological:  Negative for dizziness, focal weakness, numbness, seizures and tremors.  Psychological:  Negative for depression and psychiatric problem.  Endocrine:  Negative for cold intolerance, thirst, excessive urination, fatigue and heat intolerance.  Hem/Lymphatic:  Negative for easy bleeding, easy bruising and frequent infections.      Urinalysis  UA - Dipstick  Results for orders placed or performed in visit on 05/29/24   AMB POC URINALYSIS DIP STICK AUTO W/O MICRO   Result Value Ref Range    Color (UA POC)      Clarity (UA POC)      Glucose, Urine, POC Negative     Bilirubin, Urine, POC Negative     KETONES, Urine, POC Negative     Specific Gravity, Urine, POC 1.020 1.001 - 1.035    Blood (UA POC) Negative     pH, Urine, POC 7.0 4.6 - 8.0    Protein, Urine, POC Negative     Urobilinogen, POC 0.2 mg/dL     Nitrite, Urine, POC Negative     Leukocyte Esterase, Urine, POC Negative        UA - Micro  WBC - 0  RBC - 0  Bacteria - 0  Epith - 0    There were no vitals taken for this visit.     GENERAL: No acute distress, Awake, Alert, Oriented X 3, Gait normal  CARDIAC: regular rate and rhythm  CHEST AND LUNG: Easy work of breathing, clear to auscultation bilaterally, no cyanosis  ABDOMEN: soft, non tender, non-distended, positive bowel sounds, no organomegaly, no palpable masses, no guarding, no rebound tenderness  ALLI: 75 cc prostate smooth symmetric without nodules.   SKIN: No rash, no erythema, no lacerations or abrasions, no ecchymosis  NEUROLOGIC: cranial nerves 2-12 grossly intact       Assessment and Plan    ICD-10-CM    1. Erectile dysfunction, unspecified erectile dysfunction type  N52.9 AMB

## 2024-05-31 ASSESSMENT — ENCOUNTER SYMPTOMS
WHEEZING: 0
HEARTBURN: 0
INDIGESTION: 0
CONSTIPATION: 0
SHORTNESS OF BREATH: 0
VOMITING: 0
EYE PAIN: 0
COUGH: 0
EYE DISCHARGE: 0
SKIN LESIONS: 0
BLOOD IN STOOL: 0
DIARRHEA: 0
ABDOMINAL PAIN: 0

## 2024-08-30 ENCOUNTER — APPOINTMENT (RX ONLY)
Dept: URBAN - METROPOLITAN AREA CLINIC 329 | Facility: CLINIC | Age: 74
Setting detail: DERMATOLOGY
End: 2024-08-30

## 2024-08-30 DIAGNOSIS — D22 MELANOCYTIC NEVI: ICD-10-CM

## 2024-08-30 DIAGNOSIS — L57.8 OTHER SKIN CHANGES DUE TO CHRONIC EXPOSURE TO NONIONIZING RADIATION: ICD-10-CM | Status: STABLE

## 2024-08-30 DIAGNOSIS — L81.4 OTHER MELANIN HYPERPIGMENTATION: ICD-10-CM

## 2024-08-30 DIAGNOSIS — Z85.828 PERSONAL HISTORY OF OTHER MALIGNANT NEOPLASM OF SKIN: ICD-10-CM

## 2024-08-30 DIAGNOSIS — L82.1 OTHER SEBORRHEIC KERATOSIS: ICD-10-CM

## 2024-08-30 DIAGNOSIS — L57.0 ACTINIC KERATOSIS: ICD-10-CM

## 2024-08-30 DIAGNOSIS — D18.0 HEMANGIOMA: ICD-10-CM

## 2024-08-30 DIAGNOSIS — D485 NEOPLASM OF UNCERTAIN BEHAVIOR OF SKIN: ICD-10-CM

## 2024-08-30 PROBLEM — D48.5 NEOPLASM OF UNCERTAIN BEHAVIOR OF SKIN: Status: ACTIVE | Noted: 2024-08-30

## 2024-08-30 PROBLEM — D22.5 MELANOCYTIC NEVI OF TRUNK: Status: ACTIVE | Noted: 2024-08-30

## 2024-08-30 PROBLEM — D18.01 HEMANGIOMA OF SKIN AND SUBCUTANEOUS TISSUE: Status: ACTIVE | Noted: 2024-08-30

## 2024-08-30 PROBLEM — D22.61 MELANOCYTIC NEVI OF RIGHT UPPER LIMB, INCLUDING SHOULDER: Status: ACTIVE | Noted: 2024-08-30

## 2024-08-30 PROBLEM — D22.71 MELANOCYTIC NEVI OF RIGHT LOWER LIMB, INCLUDING HIP: Status: ACTIVE | Noted: 2024-08-30

## 2024-08-30 PROCEDURE — 17000 DESTRUCT PREMALG LESION: CPT | Mod: 59

## 2024-08-30 PROCEDURE — 17003 DESTRUCT PREMALG LES 2-14: CPT

## 2024-08-30 PROCEDURE — ? DERMATOSCOPIC EVALUATION

## 2024-08-30 PROCEDURE — ? COUNSELING

## 2024-08-30 PROCEDURE — ? BIOPSY BY SHAVE METHOD

## 2024-08-30 PROCEDURE — ? LIQUID NITROGEN

## 2024-08-30 PROCEDURE — 11102 TANGNTL BX SKIN SINGLE LES: CPT

## 2024-08-30 PROCEDURE — ? ADDITIONAL NOTES

## 2024-08-30 PROCEDURE — 99213 OFFICE O/P EST LOW 20 MIN: CPT | Mod: 25

## 2024-08-30 PROCEDURE — ? TREATMENT REGIMEN

## 2024-08-30 ASSESSMENT — LOCATION DETAILED DESCRIPTION DERM
LOCATION DETAILED: RIGHT POSTERIOR ANKLE
LOCATION DETAILED: LEFT MEDIAL UPPER BACK
LOCATION DETAILED: MID-FRONTAL SCALP
LOCATION DETAILED: RIGHT INFERIOR MEDIAL UPPER BACK
LOCATION DETAILED: RIGHT SUPERIOR PARIETAL SCALP
LOCATION DETAILED: EPIGASTRIC SKIN
LOCATION DETAILED: LEFT SUPERIOR MEDIAL MALAR CHEEK
LOCATION DETAILED: RIGHT ANTERIOR PROXIMAL UPPER ARM
LOCATION DETAILED: MIDDLE STERNUM
LOCATION DETAILED: LEFT CENTRAL FRONTAL SCALP
LOCATION DETAILED: RIGHT SUPERIOR OCCIPITAL SCALP
LOCATION DETAILED: LEFT DISTAL PRETIBIAL REGION
LOCATION DETAILED: LEFT MEDIAL FOREHEAD
LOCATION DETAILED: RIGHT ANTERIOR DISTAL THIGH
LOCATION DETAILED: RIGHT DISTAL POSTERIOR THIGH
LOCATION DETAILED: RIGHT PROXIMAL DORSAL FOREARM
LOCATION DETAILED: RIGHT DISTAL PRETIBIAL REGION
LOCATION DETAILED: RIGHT PROXIMAL POSTERIOR UPPER ARM
LOCATION DETAILED: LEFT CENTRAL PARIETAL SCALP
LOCATION DETAILED: RIGHT SUPERIOR MEDIAL FOREHEAD
LOCATION DETAILED: RIGHT SUPERIOR MEDIAL UPPER BACK
LOCATION DETAILED: LEFT INFERIOR ANTERIOR NECK
LOCATION DETAILED: RIGHT CENTRAL FRONTAL SCALP

## 2024-08-30 ASSESSMENT — LOCATION SIMPLE DESCRIPTION DERM
LOCATION SIMPLE: LEFT CHEEK
LOCATION SIMPLE: SCALP
LOCATION SIMPLE: RIGHT OCCIPITAL SCALP
LOCATION SIMPLE: LEFT FOREHEAD
LOCATION SIMPLE: RIGHT FOREARM
LOCATION SIMPLE: LEFT PRETIBIAL REGION
LOCATION SIMPLE: RIGHT SCALP
LOCATION SIMPLE: RIGHT PRETIBIAL REGION
LOCATION SIMPLE: CHEST
LOCATION SIMPLE: RIGHT UPPER ARM
LOCATION SIMPLE: RIGHT POSTERIOR THIGH
LOCATION SIMPLE: RIGHT THIGH
LOCATION SIMPLE: LEFT ANTERIOR NECK
LOCATION SIMPLE: RIGHT UPPER BACK
LOCATION SIMPLE: ABDOMEN
LOCATION SIMPLE: RIGHT FOREHEAD
LOCATION SIMPLE: RIGHT POSTERIOR UPPER ARM
LOCATION SIMPLE: LEFT SCALP
LOCATION SIMPLE: RIGHT ANKLE
LOCATION SIMPLE: LEFT UPPER BACK
LOCATION SIMPLE: ANTERIOR SCALP

## 2024-08-30 ASSESSMENT — LOCATION ZONE DERM
LOCATION ZONE: ARM
LOCATION ZONE: LEG
LOCATION ZONE: SCALP
LOCATION ZONE: NECK
LOCATION ZONE: TRUNK
LOCATION ZONE: FACE

## 2024-08-30 NOTE — HPI: EVALUATION OF SKIN LESION(S)
What Type Of Note Output Would You Prefer (Optional)?: Bullet Format
Hpi Title: Evaluation of a Skin Lesion
Additional History: The patient has a spot on his left hip that has gotten larger.

## 2024-11-20 ENCOUNTER — LAB (OUTPATIENT)
Dept: UROLOGY | Age: 74
End: 2024-11-20

## 2024-11-20 DIAGNOSIS — R97.20 ELEVATED PSA: ICD-10-CM

## 2024-11-20 LAB — PSA SERPL-MCNC: 9.8 NG/ML (ref 0–4)

## 2024-12-09 ENCOUNTER — OFFICE VISIT (OUTPATIENT)
Dept: UROLOGY | Age: 74
End: 2024-12-09
Payer: MEDICARE

## 2024-12-09 DIAGNOSIS — R97.20 ELEVATED PSA: Primary | ICD-10-CM

## 2024-12-09 DIAGNOSIS — N52.9 ERECTILE DYSFUNCTION, UNSPECIFIED ERECTILE DYSFUNCTION TYPE: ICD-10-CM

## 2024-12-09 LAB
BILIRUBIN, URINE, POC: NEGATIVE
BLOOD URINE, POC: NEGATIVE
GLUCOSE URINE, POC: NEGATIVE MG/DL
KETONES, URINE, POC: NEGATIVE MG/DL
LEUKOCYTE ESTERASE, URINE, POC: NEGATIVE
NITRITE, URINE, POC: NEGATIVE
PH, URINE, POC: 6.5 (ref 4.6–8)
PROTEIN,URINE, POC: NEGATIVE MG/DL
SPECIFIC GRAVITY, URINE, POC: 1.02 (ref 1–1.03)
URINALYSIS CLARITY, POC: NORMAL
URINALYSIS COLOR, POC: NORMAL
UROBILINOGEN, POC: NORMAL MG/DL

## 2024-12-09 PROCEDURE — 1123F ACP DISCUSS/DSCN MKR DOCD: CPT | Performed by: NURSE PRACTITIONER

## 2024-12-09 PROCEDURE — 1036F TOBACCO NON-USER: CPT | Performed by: NURSE PRACTITIONER

## 2024-12-09 PROCEDURE — G8421 BMI NOT CALCULATED: HCPCS | Performed by: NURSE PRACTITIONER

## 2024-12-09 PROCEDURE — 81003 URINALYSIS AUTO W/O SCOPE: CPT | Performed by: NURSE PRACTITIONER

## 2024-12-09 PROCEDURE — G8484 FLU IMMUNIZE NO ADMIN: HCPCS | Performed by: NURSE PRACTITIONER

## 2024-12-09 PROCEDURE — 99214 OFFICE O/P EST MOD 30 MIN: CPT | Performed by: NURSE PRACTITIONER

## 2024-12-09 PROCEDURE — 3017F COLORECTAL CA SCREEN DOC REV: CPT | Performed by: NURSE PRACTITIONER

## 2024-12-09 PROCEDURE — 1160F RVW MEDS BY RX/DR IN RCRD: CPT | Performed by: NURSE PRACTITIONER

## 2024-12-09 PROCEDURE — 1159F MED LIST DOCD IN RCRD: CPT | Performed by: NURSE PRACTITIONER

## 2024-12-09 PROCEDURE — G8427 DOCREV CUR MEDS BY ELIG CLIN: HCPCS | Performed by: NURSE PRACTITIONER

## 2024-12-09 ASSESSMENT — ENCOUNTER SYMPTOMS
BACK PAIN: 0
NAUSEA: 0

## 2024-12-09 NOTE — PROGRESS NOTES
[facility]  [address] [place] [address]  [phone]          Gordo Perez Jr.  : 1950    Chief Complaint   Patient presents with    Follow-up          HPI     Gordo Perez Jr. is a 74 y.o. male with a PMH of elevated PSA s/p negative TRUS biopsy 2012, negative MRI fusion prostate biopsy 10/2023 and ED on sildenafil who returns for follow up.  Previously followed with Dr. Foreman. Now by myself and Dr Jaimes.      He reports no significant changes since his biopsy. PSA continues to trend up. He is voiding well without bothersome urgency, frequency, urinary retention, hematuria, urinary incontinence.  He is not on any prostate meds.  No known cause for PSA rise.    MRI prostate 2023 showed PIRADS 4 lesion in L peripheral zone but no concern for metastatic prostate cancer. PSA 9.8 on 24. Will continue PSA surveillance q6 months for now.       In 2022, he changed to cialis 20 mg PRN because Sildenafil was no longer working well for his ED.  Cialis works OK for him.     Just go a new yellow lab puppy (9 weeks old) named Justo.     Past Medical History:   Diagnosis Date    Abdominal pain, other specified site     Acute sinusitis, unspecified     Calculus of ureter     Elevated prostate specific antigen (PSA)     GERD (gastroesophageal reflux disease)     Hypercholesterolemia     Hyperparathyroidism, unspecified (HCC)     Kidney stones      Past Surgical History:   Procedure Laterality Date    COLONOSCOPY      LITHOTRIPSY      ORTHOPEDIC SURGERY Right     knee surgery    ORTHOPEDIC SURGERY Right     ankle    OTHER SURGICAL HISTORY      ESWL    PROSTATE BIOPSY N/A 10/5/2023    MRI FUSION PROSTATE BIOPSY performed by Yaya Jaimes MD at [facility] MAIN OR     Current Outpatient Medications   Medication Sig Dispense Refill    tadalafil (CIALIS) 20 MG tablet Take 1 tablet by mouth every 72 hours as needed for Erectile Dysfunction 60 tablet 5

## 2025-01-03 ENCOUNTER — APPOINTMENT (OUTPATIENT)
Dept: URBAN - METROPOLITAN AREA CLINIC 329 | Facility: CLINIC | Age: 75
Setting detail: DERMATOLOGY
End: 2025-01-03

## 2025-01-03 DIAGNOSIS — D485 NEOPLASM OF UNCERTAIN BEHAVIOR OF SKIN: ICD-10-CM

## 2025-01-03 DIAGNOSIS — L57.0 ACTINIC KERATOSIS: ICD-10-CM

## 2025-01-03 DIAGNOSIS — L82.1 OTHER SEBORRHEIC KERATOSIS: ICD-10-CM

## 2025-01-03 DIAGNOSIS — Z85.828 PERSONAL HISTORY OF OTHER MALIGNANT NEOPLASM OF SKIN: ICD-10-CM

## 2025-01-03 DIAGNOSIS — L57.8 OTHER SKIN CHANGES DUE TO CHRONIC EXPOSURE TO NONIONIZING RADIATION: ICD-10-CM

## 2025-01-03 DIAGNOSIS — L81.4 OTHER MELANIN HYPERPIGMENTATION: ICD-10-CM

## 2025-01-03 PROBLEM — D48.5 NEOPLASM OF UNCERTAIN BEHAVIOR OF SKIN: Status: ACTIVE | Noted: 2025-01-03

## 2025-01-03 PROCEDURE — 99213 OFFICE O/P EST LOW 20 MIN: CPT | Mod: 25

## 2025-01-03 PROCEDURE — 11102 TANGNTL BX SKIN SINGLE LES: CPT | Mod: 59

## 2025-01-03 PROCEDURE — ? COUNSELING

## 2025-01-03 PROCEDURE — ? TREATMENT REGIMEN

## 2025-01-03 PROCEDURE — 17004 DESTROY PREMAL LESIONS 15/>: CPT

## 2025-01-03 PROCEDURE — ? LIQUID NITROGEN

## 2025-01-03 PROCEDURE — ? BIOPSY BY SHAVE METHOD

## 2025-01-03 PROCEDURE — ? FULL BODY SKIN EXAM - DECLINED

## 2025-01-03 PROCEDURE — ? SUNSCREEN RECOMMENDATIONS

## 2025-01-03 PROCEDURE — 11103 TANGNTL BX SKIN EA SEP/ADDL: CPT

## 2025-01-03 ASSESSMENT — LOCATION SIMPLE DESCRIPTION DERM
LOCATION SIMPLE: RIGHT SCALP
LOCATION SIMPLE: SCALP
LOCATION SIMPLE: LEFT SCALP
LOCATION SIMPLE: RIGHT ANTERIOR NECK
LOCATION SIMPLE: LEFT OCCIPITAL SCALP
LOCATION SIMPLE: LEFT FOREHEAD
LOCATION SIMPLE: CHEST
LOCATION SIMPLE: LEFT UPPER BACK
LOCATION SIMPLE: RIGHT FOREHEAD

## 2025-01-03 ASSESSMENT — LOCATION ZONE DERM
LOCATION ZONE: FACE
LOCATION ZONE: SCALP
LOCATION ZONE: NECK
LOCATION ZONE: TRUNK

## 2025-01-03 ASSESSMENT — LOCATION DETAILED DESCRIPTION DERM
LOCATION DETAILED: LEFT SUPERIOR OCCIPITAL SCALP
LOCATION DETAILED: LEFT INFERIOR FOREHEAD
LOCATION DETAILED: LEFT CENTRAL FRONTAL SCALP
LOCATION DETAILED: RIGHT MEDIAL FRONTAL SCALP
LOCATION DETAILED: LEFT MEDIAL FRONTAL SCALP
LOCATION DETAILED: RIGHT SUPERIOR PARIETAL SCALP
LOCATION DETAILED: LEFT CENTRAL PARIETAL SCALP
LOCATION DETAILED: RIGHT CLAVICULAR NECK
LOCATION DETAILED: LEFT MEDIAL UPPER BACK
LOCATION DETAILED: LEFT SUPERIOR PARIETAL SCALP
LOCATION DETAILED: RIGHT MEDIAL FOREHEAD
LOCATION DETAILED: LEFT MEDIAL INFERIOR CHEST
LOCATION DETAILED: LEFT SUPERIOR FOREHEAD
LOCATION DETAILED: RIGHT CENTRAL FRONTAL SCALP

## 2025-01-03 NOTE — HPI: EVALUATION OF SKIN LESION(S)
What Type Of Note Output Would You Prefer (Optional)?: Bullet Format
Hpi Title: Evaluation of Skin Lesions
Additional History: The patient reports a skin lesion on his right shoulder that he picks at.

## 2025-01-03 NOTE — PROCEDURE: BIOPSY BY SHAVE METHOD
Detail Level: Detailed
Depth Of Biopsy: dermis
Was A Bandage Applied: Yes
Size Of Lesion In Cm: 0.5
X Size Of Lesion In Cm: 0
Biopsy Type: H and E
Biopsy Method: Dermablade
Anesthesia Type: 1% lidocaine with epinephrine
Hemostasis: Drysol
Wound Care: Petrolatum
Dressing: bandage
Destruction After The Procedure: No
Type Of Destruction Used: Curettage
Curettage Text: The wound bed was treated with curettage after the biopsy was performed.
Cryotherapy Text: The wound bed was treated with cryotherapy after the biopsy was performed.
Electrodesiccation Text: The wound bed was treated with electrodesiccation after the biopsy was performed.
Electrodesiccation And Curettage Text: The wound bed was treated with electrodesiccation and curettage after the biopsy was performed.
Silver Nitrate Text: The wound bed was treated with silver nitrate after the biopsy was performed.
Lab: 6
Lab Facility: 3
Medical Necessity Information: It is in your best interest to select a reason for this procedure from the list below. All of these items fulfill various CMS LCD requirements except the new and changing color options.
Consent was obtained and risks were reviewed including but not limited to scarring, infection, bleeding, scabbing, incomplete removal, nerve damage and allergy to anesthesia.
Post-Care Instructions: I reviewed with the patient in detail post-care instructions. Patient is to keep the biopsy site dry overnight, and then apply bacitracin twice daily until healed. Patient may apply hydrogen peroxide soaks to remove any crusting.
Notification Instructions: Patient will be notified of biopsy results. However, patient instructed to call the office if not contacted within 2 weeks.
Billing Type: Third-Party Bill
Information: Selecting Yes will display possible errors in your note based on the variables you have selected. This validation is only offered as a suggestion for you. PLEASE NOTE THAT THE VALIDATION TEXT WILL BE REMOVED WHEN YOU FINALIZE YOUR NOTE. IF YOU WANT TO FAX A PRELIMINARY NOTE YOU WILL NEED TO TOGGLE THIS TO 'NO' IF YOU DO NOT WANT IT IN YOUR FAXED NOTE.
Size Of Lesion In Cm: 0.9

## 2025-02-11 ENCOUNTER — APPOINTMENT (OUTPATIENT)
Dept: URBAN - METROPOLITAN AREA CLINIC 329 | Facility: CLINIC | Age: 75
Setting detail: DERMATOLOGY
End: 2025-02-11

## 2025-02-11 PROBLEM — C44.519 BASAL CELL CARCINOMA OF SKIN OF OTHER PART OF TRUNK: Status: ACTIVE | Noted: 2025-02-11

## 2025-02-11 PROCEDURE — 17262 DSTRJ MAL LES T/A/L 1.1-2.0: CPT

## 2025-02-11 PROCEDURE — ? CURETTAGE AND DESTRUCTION

## 2025-02-11 NOTE — PROCEDURE: CURETTAGE AND DESTRUCTION
Detail Level: Detailed
Number Of Curettages: 3
Size Of Lesion After Curettage: 1.2
Add Intralesional Injection: No
Concentration (Mg/Ml Or Millions Of Plaque Forming Units/Cc): 0.01
Total Volume (Ccs): 1
Anesthesia Type: 1% lidocaine with epinephrine
Cautery Type: electrodesiccation
What Was Performed First?: Curettage
Final Size Statement: The size of the lesion after curettage was
Additional Information: (Optional): The wound was cleaned, and a pressure dressing was applied.  The patient received detailed post-op instructions.
Consent was obtained from the patient. The risks, benefits and alternatives to therapy were discussed in detail. Specifically, the risks of infection, scarring, bleeding, prolonged wound healing, nerve injury, incomplete removal, allergy to anesthesia and recurrence were addressed. Alternatives to ED&C, such as: surgical removal and XRT were also discussed.  Prior to the procedure, the treatment site was clearly identified and confirmed by the patient.
Post-Care Instructions: I reviewed with the patient in detail post-care instructions. Patient is to keep the area dry for 48 hours, and not to engage in any swimming until the area is healed. Should the patient develop any fevers, chills, bleeding, severe pain patient will contact the office immediately.
Bill As A Line Item Or As Units: Line Item

## 2025-06-02 ENCOUNTER — LAB (OUTPATIENT)
Dept: UROLOGY | Age: 75
End: 2025-06-02

## 2025-06-02 DIAGNOSIS — R97.20 ELEVATED PSA: ICD-10-CM

## 2025-06-02 DIAGNOSIS — R97.20 ELEVATED PSA: Primary | ICD-10-CM

## 2025-06-02 LAB — PSA SERPL-MCNC: 8.2 NG/ML (ref 0–4)

## 2025-06-09 ENCOUNTER — OFFICE VISIT (OUTPATIENT)
Dept: UROLOGY | Age: 75
End: 2025-06-09
Payer: MEDICARE

## 2025-06-09 DIAGNOSIS — N52.9 ERECTILE DYSFUNCTION, UNSPECIFIED ERECTILE DYSFUNCTION TYPE: ICD-10-CM

## 2025-06-09 DIAGNOSIS — R97.20 ELEVATED PSA: Primary | ICD-10-CM

## 2025-06-09 LAB
BILIRUBIN, URINE, POC: NEGATIVE
BLOOD URINE, POC: NEGATIVE
GLUCOSE URINE, POC: NEGATIVE MG/DL
KETONES, URINE, POC: NEGATIVE MG/DL
LEUKOCYTE ESTERASE, URINE, POC: NEGATIVE
NITRITE, URINE, POC: NEGATIVE
PH, URINE, POC: 7 (ref 4.6–8)
PROTEIN,URINE, POC: NEGATIVE MG/DL
SPECIFIC GRAVITY, URINE, POC: 1.01 (ref 1–1.03)
URINALYSIS CLARITY, POC: NORMAL
URINALYSIS COLOR, POC: NORMAL
UROBILINOGEN, POC: NORMAL MG/DL

## 2025-06-09 PROCEDURE — 81003 URINALYSIS AUTO W/O SCOPE: CPT | Performed by: UROLOGY

## 2025-06-09 PROCEDURE — 99214 OFFICE O/P EST MOD 30 MIN: CPT | Performed by: UROLOGY

## 2025-06-09 PROCEDURE — 1123F ACP DISCUSS/DSCN MKR DOCD: CPT | Performed by: UROLOGY

## 2025-06-09 PROCEDURE — G8421 BMI NOT CALCULATED: HCPCS | Performed by: UROLOGY

## 2025-06-09 PROCEDURE — 1036F TOBACCO NON-USER: CPT | Performed by: UROLOGY

## 2025-06-09 PROCEDURE — 1159F MED LIST DOCD IN RCRD: CPT | Performed by: UROLOGY

## 2025-06-09 PROCEDURE — G8427 DOCREV CUR MEDS BY ELIG CLIN: HCPCS | Performed by: UROLOGY

## 2025-06-09 PROCEDURE — 3017F COLORECTAL CA SCREEN DOC REV: CPT | Performed by: UROLOGY

## 2025-06-09 RX ORDER — TADALAFIL 20 MG/1
20 TABLET ORAL
Qty: 60 TABLET | Refills: 5 | Status: SHIPPED | OUTPATIENT
Start: 2025-06-09

## 2025-06-09 ASSESSMENT — ENCOUNTER SYMPTOMS
WHEEZING: 0
SHORTNESS OF BREATH: 0
COUGH: 0
BLOOD IN STOOL: 0
INDIGESTION: 0
ABDOMINAL PAIN: 0
CONSTIPATION: 0
SKIN LESIONS: 0
HEARTBURN: 0
DIARRHEA: 0
EYE DISCHARGE: 0
BACK PAIN: 0
VOMITING: 0
NAUSEA: 0
EYE PAIN: 0

## 2025-06-09 NOTE — PROGRESS NOTES
Intelligence will be utilized during this visit to record, process the conversation to generate a clinical note, and support improvement of the AI technology. The patient (or guardian, if applicable) and other individuals in attendance at the appointment consented to the use of AI, including the recording.      I have spent 30 minutes today reviewing previous notes, test results and face to face with the patient as well as documenting.      Yaya Jaimes M.D.    [facility] [place] [address]  Phone: [phone]  Fax: [phone]

## 2025-07-09 ENCOUNTER — APPOINTMENT (OUTPATIENT)
Dept: URBAN - METROPOLITAN AREA CLINIC 329 | Facility: CLINIC | Age: 75
Setting detail: DERMATOLOGY
End: 2025-07-09

## 2025-07-09 DIAGNOSIS — D22 MELANOCYTIC NEVI: ICD-10-CM

## 2025-07-09 DIAGNOSIS — L81.4 OTHER MELANIN HYPERPIGMENTATION: ICD-10-CM

## 2025-07-09 DIAGNOSIS — L57.8 OTHER SKIN CHANGES DUE TO CHRONIC EXPOSURE TO NONIONIZING RADIATION: ICD-10-CM | Status: STABLE

## 2025-07-09 DIAGNOSIS — L82.1 OTHER SEBORRHEIC KERATOSIS: ICD-10-CM

## 2025-07-09 DIAGNOSIS — L57.0 ACTINIC KERATOSIS: ICD-10-CM

## 2025-07-09 DIAGNOSIS — D18.0 HEMANGIOMA: ICD-10-CM

## 2025-07-09 DIAGNOSIS — Z85.828 PERSONAL HISTORY OF OTHER MALIGNANT NEOPLASM OF SKIN: ICD-10-CM

## 2025-07-09 PROBLEM — D22.71 MELANOCYTIC NEVI OF RIGHT LOWER LIMB, INCLUDING HIP: Status: ACTIVE | Noted: 2025-07-09

## 2025-07-09 PROBLEM — D18.01 HEMANGIOMA OF SKIN AND SUBCUTANEOUS TISSUE: Status: ACTIVE | Noted: 2025-07-09

## 2025-07-09 PROBLEM — D22.5 MELANOCYTIC NEVI OF TRUNK: Status: ACTIVE | Noted: 2025-07-09

## 2025-07-09 PROBLEM — D22.61 MELANOCYTIC NEVI OF RIGHT UPPER LIMB, INCLUDING SHOULDER: Status: ACTIVE | Noted: 2025-07-09

## 2025-07-09 PROCEDURE — ? TREATMENT REGIMEN

## 2025-07-09 PROCEDURE — ? LIQUID NITROGEN

## 2025-07-09 PROCEDURE — ? DERMATOSCOPIC EVALUATION

## 2025-07-09 PROCEDURE — ? ADDITIONAL NOTES

## 2025-07-09 PROCEDURE — ? COUNSELING

## 2025-07-09 ASSESSMENT — LOCATION SIMPLE DESCRIPTION DERM
LOCATION SIMPLE: SCALP
LOCATION SIMPLE: CHEST
LOCATION SIMPLE: RIGHT PRETIBIAL REGION
LOCATION SIMPLE: LEFT UPPER BACK
LOCATION SIMPLE: ABDOMEN
LOCATION SIMPLE: RIGHT POSTERIOR UPPER ARM
LOCATION SIMPLE: LEFT SCALP
LOCATION SIMPLE: RIGHT POSTERIOR THIGH
LOCATION SIMPLE: RIGHT ANKLE
LOCATION SIMPLE: RIGHT UPPER ARM
LOCATION SIMPLE: RIGHT SCALP
LOCATION SIMPLE: RIGHT FOREHEAD
LOCATION SIMPLE: LEFT FOREHEAD
LOCATION SIMPLE: LEFT ANTERIOR NECK
LOCATION SIMPLE: RIGHT OCCIPITAL SCALP
LOCATION SIMPLE: RIGHT THIGH
LOCATION SIMPLE: RIGHT UPPER BACK
LOCATION SIMPLE: RIGHT FOREARM
LOCATION SIMPLE: LEFT PRETIBIAL REGION
LOCATION SIMPLE: LEFT TEMPLE
LOCATION SIMPLE: LEFT LOWER BACK

## 2025-07-09 ASSESSMENT — LOCATION DETAILED DESCRIPTION DERM
LOCATION DETAILED: LEFT DISTAL PRETIBIAL REGION
LOCATION DETAILED: RIGHT INFERIOR MEDIAL UPPER BACK
LOCATION DETAILED: RIGHT ANTERIOR DISTAL THIGH
LOCATION DETAILED: RIGHT SUPERIOR PARIETAL SCALP
LOCATION DETAILED: RIGHT SUPERIOR FOREHEAD
LOCATION DETAILED: LEFT CENTRAL PARIETAL SCALP
LOCATION DETAILED: LEFT SUPERIOR PARIETAL SCALP
LOCATION DETAILED: RIGHT POSTERIOR ANKLE
LOCATION DETAILED: MIDDLE STERNUM
LOCATION DETAILED: LEFT CENTRAL TEMPLE
LOCATION DETAILED: RIGHT ANTERIOR PROXIMAL UPPER ARM
LOCATION DETAILED: LEFT SUPERIOR FOREHEAD
LOCATION DETAILED: RIGHT SUPERIOR OCCIPITAL SCALP
LOCATION DETAILED: RIGHT PROXIMAL DORSAL FOREARM
LOCATION DETAILED: EPIGASTRIC SKIN
LOCATION DETAILED: RIGHT CENTRAL PARIETAL SCALP
LOCATION DETAILED: RIGHT SUPERIOR LATERAL FOREHEAD
LOCATION DETAILED: LEFT MEDIAL FRONTAL SCALP
LOCATION DETAILED: RIGHT SUPERIOR MEDIAL UPPER BACK
LOCATION DETAILED: RIGHT DISTAL PRETIBIAL REGION
LOCATION DETAILED: RIGHT LATERAL FOREHEAD
LOCATION DETAILED: RIGHT SUPERIOR MEDIAL FOREHEAD
LOCATION DETAILED: RIGHT DISTAL POSTERIOR THIGH
LOCATION DETAILED: LEFT INFERIOR ANTERIOR NECK
LOCATION DETAILED: LEFT MEDIAL UPPER BACK
LOCATION DETAILED: RIGHT CENTRAL FRONTAL SCALP
LOCATION DETAILED: RIGHT PROXIMAL POSTERIOR UPPER ARM
LOCATION DETAILED: LEFT INFERIOR MEDIAL MIDBACK

## 2025-07-09 ASSESSMENT — LOCATION ZONE DERM
LOCATION ZONE: SCALP
LOCATION ZONE: LEG
LOCATION ZONE: TRUNK
LOCATION ZONE: FACE
LOCATION ZONE: ARM
LOCATION ZONE: NECK

## (undated) DEVICE — JELLY,LUBE,STERILE,FLIP TOP,TUBE,4-OZ: Brand: MEDLINE

## (undated) DEVICE — HAND PACK: Brand: MEDLINE INDUSTRIES, INC.

## (undated) DEVICE — STERILE PACKED. BORE DIAMETER 1.6 MM; ANGLE OF INSERTION 19° TO THE LONG AXIS OF THE TRANSDUCER: Brand: SINGLE-USE BIPLANE BIOPSY GUIDE

## (undated) DEVICE — SOLUTION IV 1000ML 0.9% SOD CHL

## (undated) DEVICE — BANDAGE,ELASTIC,ESMARK,STERILE,4"X9',LF: Brand: MEDLINE

## (undated) DEVICE — C-ARM: Brand: UNBRANDED

## (undated) DEVICE — DRAPE TWL SURG 16X26IN BLU ORB04] ALLCARE INC]

## (undated) DEVICE — KIT BX PROST 20ML VI PREFIL W 10ML 10% NEUT BUFF FRMLN LEAK

## (undated) DEVICE — COVER PRB W1XL11.8IN ENDOCAVITY CLR E BND NEOGUARD

## (undated) DEVICE — DRAPE XR C ARM 41X74IN LF --

## (undated) DEVICE — PREP SKN CHLRAPRP APL 26ML STR --

## (undated) DEVICE — PADDING CAST COHESIVE 4 YDX3 IN HND TEARABLE COTTON SPEC 100

## (undated) DEVICE — DRAPE, FILM SHEET, 44X65 STERILE: Brand: MEDLINE

## (undated) DEVICE — ZIMMER® STERILE DISPOSABLE TOURNIQUET CUFF WITH PLC, DUAL PORT, SINGLE BLADDER, 18 IN. (46 CM)

## (undated) DEVICE — PADDING CAST W4INXL4YD ST COT COHESIVE HND TEARABLE SPEC

## (undated) DEVICE — MAX-CORE® DISPOSABLE CORE BIOPSY INSTRUMENT, 18G X 25CM: Brand: MAX-CORE

## (undated) DEVICE — SUTURE VCRL SZ 3-0 L27IN ABSRB UD L26MM SH 1/2 CIR J416H

## (undated) DEVICE — SUT ETHLN 3-0 18IN PS2 BLK --

## (undated) DEVICE — 1.8MM DRILL BIT WITH DEPTH MARK/QC/110MM

## (undated) DEVICE — DRAPE,HAND,STERILE: Brand: MEDLINE